# Patient Record
Sex: FEMALE | Race: WHITE | NOT HISPANIC OR LATINO | URBAN - METROPOLITAN AREA
[De-identification: names, ages, dates, MRNs, and addresses within clinical notes are randomized per-mention and may not be internally consistent; named-entity substitution may affect disease eponyms.]

---

## 2020-10-16 ENCOUNTER — EMERGENCY (EMERGENCY)
Facility: HOSPITAL | Age: 75
LOS: 1 days | Discharge: HOME | End: 2020-10-16
Attending: INTERNAL MEDICINE | Admitting: INTERNAL MEDICINE
Payer: MEDICARE

## 2020-10-16 VITALS
HEIGHT: 64 IN | TEMPERATURE: 98 F | SYSTOLIC BLOOD PRESSURE: 211 MMHG | WEIGHT: 162.92 LBS | RESPIRATION RATE: 16 BRPM | HEART RATE: 67 BPM | DIASTOLIC BLOOD PRESSURE: 76 MMHG | OXYGEN SATURATION: 97 %

## 2020-10-16 VITALS
DIASTOLIC BLOOD PRESSURE: 75 MMHG | RESPIRATION RATE: 17 BRPM | HEART RATE: 68 BPM | SYSTOLIC BLOOD PRESSURE: 182 MMHG | OXYGEN SATURATION: 96 % | TEMPERATURE: 98 F

## 2020-10-16 DIAGNOSIS — M25.569 PAIN IN UNSPECIFIED KNEE: ICD-10-CM

## 2020-10-16 LAB
ALBUMIN SERPL ELPH-MCNC: 3.7 G/DL — SIGNIFICANT CHANGE UP (ref 3.3–5)
ALP SERPL-CCNC: 75 U/L — SIGNIFICANT CHANGE UP (ref 40–120)
ALT FLD-CCNC: 11 U/L — SIGNIFICANT CHANGE UP (ref 10–45)
ANION GAP SERPL CALC-SCNC: 8 MMOL/L — SIGNIFICANT CHANGE UP (ref 5–17)
AST SERPL-CCNC: 22 U/L — SIGNIFICANT CHANGE UP (ref 10–40)
BASOPHILS # BLD AUTO: 0.04 K/UL — SIGNIFICANT CHANGE UP (ref 0–0.2)
BASOPHILS NFR BLD AUTO: 0.5 % — SIGNIFICANT CHANGE UP (ref 0–2)
BILIRUB SERPL-MCNC: 0.4 MG/DL — SIGNIFICANT CHANGE UP (ref 0.2–1.2)
BUN SERPL-MCNC: 14 MG/DL — SIGNIFICANT CHANGE UP (ref 7–23)
CALCIUM SERPL-MCNC: 9.7 MG/DL — SIGNIFICANT CHANGE UP (ref 8.4–10.5)
CHLORIDE SERPL-SCNC: 106 MMOL/L — SIGNIFICANT CHANGE UP (ref 96–108)
CO2 SERPL-SCNC: 29 MMOL/L — SIGNIFICANT CHANGE UP (ref 22–31)
CREAT SERPL-MCNC: 0.91 MG/DL — SIGNIFICANT CHANGE UP (ref 0.5–1.3)
EOSINOPHIL # BLD AUTO: 0.02 K/UL — SIGNIFICANT CHANGE UP (ref 0–0.5)
EOSINOPHIL NFR BLD AUTO: 0.3 % — SIGNIFICANT CHANGE UP (ref 0–6)
GLUCOSE SERPL-MCNC: 104 MG/DL — HIGH (ref 70–99)
HCT VFR BLD CALC: 41.6 % — SIGNIFICANT CHANGE UP (ref 34.5–45)
HGB BLD-MCNC: 14.1 G/DL — SIGNIFICANT CHANGE UP (ref 11.5–15.5)
IMM GRANULOCYTES NFR BLD AUTO: 0.4 % — SIGNIFICANT CHANGE UP (ref 0–1.5)
LYMPHOCYTES # BLD AUTO: 0.98 K/UL — LOW (ref 1–3.3)
LYMPHOCYTES # BLD AUTO: 13.1 % — SIGNIFICANT CHANGE UP (ref 13–44)
MCHC RBC-ENTMCNC: 31.2 PG — SIGNIFICANT CHANGE UP (ref 27–34)
MCHC RBC-ENTMCNC: 33.9 GM/DL — SIGNIFICANT CHANGE UP (ref 32–36)
MCV RBC AUTO: 92 FL — SIGNIFICANT CHANGE UP (ref 80–100)
MONOCYTES # BLD AUTO: 0.38 K/UL — SIGNIFICANT CHANGE UP (ref 0–0.9)
MONOCYTES NFR BLD AUTO: 5.1 % — SIGNIFICANT CHANGE UP (ref 2–14)
NEUTROPHILS # BLD AUTO: 6.05 K/UL — SIGNIFICANT CHANGE UP (ref 1.8–7.4)
NEUTROPHILS NFR BLD AUTO: 80.6 % — HIGH (ref 43–77)
NRBC # BLD: 0 /100 WBCS — SIGNIFICANT CHANGE UP (ref 0–0)
PLATELET # BLD AUTO: 245 K/UL — SIGNIFICANT CHANGE UP (ref 150–400)
POTASSIUM SERPL-MCNC: 3.4 MMOL/L — LOW (ref 3.5–5.3)
POTASSIUM SERPL-SCNC: 3.4 MMOL/L — LOW (ref 3.5–5.3)
PROT SERPL-MCNC: 7.2 G/DL — SIGNIFICANT CHANGE UP (ref 6–8.3)
RBC # BLD: 4.52 M/UL — SIGNIFICANT CHANGE UP (ref 3.8–5.2)
RBC # FLD: 12.3 % — SIGNIFICANT CHANGE UP (ref 10.3–14.5)
SODIUM SERPL-SCNC: 143 MMOL/L — SIGNIFICANT CHANGE UP (ref 135–145)
TROPONIN I SERPL-MCNC: <.017 NG/ML — LOW (ref 0.02–0.06)
WBC # BLD: 7.5 K/UL — SIGNIFICANT CHANGE UP (ref 3.8–10.5)
WBC # FLD AUTO: 7.5 K/UL — SIGNIFICANT CHANGE UP (ref 3.8–10.5)

## 2020-10-16 PROCEDURE — 74176 CT ABD & PELVIS W/O CONTRAST: CPT | Mod: 26

## 2020-10-16 PROCEDURE — 99284 EMERGENCY DEPT VISIT MOD MDM: CPT | Mod: 25

## 2020-10-16 PROCEDURE — 80053 COMPREHEN METABOLIC PANEL: CPT

## 2020-10-16 PROCEDURE — 93010 ELECTROCARDIOGRAM REPORT: CPT

## 2020-10-16 PROCEDURE — 96360 HYDRATION IV INFUSION INIT: CPT

## 2020-10-16 PROCEDURE — 93005 ELECTROCARDIOGRAM TRACING: CPT

## 2020-10-16 PROCEDURE — 85025 COMPLETE CBC W/AUTO DIFF WBC: CPT

## 2020-10-16 PROCEDURE — 74176 CT ABD & PELVIS W/O CONTRAST: CPT

## 2020-10-16 PROCEDURE — 36415 COLL VENOUS BLD VENIPUNCTURE: CPT

## 2020-10-16 PROCEDURE — 93971 EXTREMITY STUDY: CPT

## 2020-10-16 PROCEDURE — 73562 X-RAY EXAM OF KNEE 3: CPT | Mod: 26,RT

## 2020-10-16 PROCEDURE — 93971 EXTREMITY STUDY: CPT | Mod: 26,RT

## 2020-10-16 PROCEDURE — 84484 ASSAY OF TROPONIN QUANT: CPT

## 2020-10-16 PROCEDURE — 73562 X-RAY EXAM OF KNEE 3: CPT

## 2020-10-16 PROCEDURE — 99284 EMERGENCY DEPT VISIT MOD MDM: CPT

## 2020-10-16 RX ORDER — DICLOFENAC SODIUM 30 MG/G
1 GEL TOPICAL
Qty: 1 | Refills: 0
Start: 2020-10-16 | End: 2021-01-13

## 2020-10-16 RX ORDER — LIDOCAINE 4 G/100G
1 CREAM TOPICAL ONCE
Refills: 0 | Status: COMPLETED | OUTPATIENT
Start: 2020-10-16 | End: 2020-10-16

## 2020-10-16 RX ORDER — SODIUM CHLORIDE 9 MG/ML
1000 INJECTION INTRAMUSCULAR; INTRAVENOUS; SUBCUTANEOUS ONCE
Refills: 0 | Status: COMPLETED | OUTPATIENT
Start: 2020-10-16 | End: 2020-10-16

## 2020-10-16 RX ORDER — OXYCODONE AND ACETAMINOPHEN 5; 325 MG/1; MG/1
1 TABLET ORAL ONCE
Refills: 0 | Status: DISCONTINUED | OUTPATIENT
Start: 2020-10-16 | End: 2020-10-16

## 2020-10-16 RX ADMIN — OXYCODONE AND ACETAMINOPHEN 1 TABLET(S): 5; 325 TABLET ORAL at 11:51

## 2020-10-16 RX ADMIN — LIDOCAINE 1 PATCH: 4 CREAM TOPICAL at 14:32

## 2020-10-16 RX ADMIN — SODIUM CHLORIDE 1000 MILLILITER(S): 9 INJECTION INTRAMUSCULAR; INTRAVENOUS; SUBCUTANEOUS at 12:00

## 2020-10-16 RX ADMIN — OXYCODONE AND ACETAMINOPHEN 1 TABLET(S): 5; 325 TABLET ORAL at 12:21

## 2020-10-16 RX ADMIN — SODIUM CHLORIDE 1000 MILLILITER(S): 9 INJECTION INTRAMUSCULAR; INTRAVENOUS; SUBCUTANEOUS at 11:00

## 2020-10-16 NOTE — ED ADULT NURSE NOTE - CHPI ED NUR SYMPTOMS NEG
no deformity/no numbness/no tingling/no abrasion/no bruising/no stiffness/no fever/no back pain/no weakness

## 2020-10-16 NOTE — ED ADULT NURSE NOTE - NSIMPLEMENTINTERV_GEN_ALL_ED
Implemented All Fall Risk Interventions:  [place] to call system. Call bell, personal items and telephone within reach. Instruct patient to call for assistance. Room bathroom lighting operational. Non-slip footwear when patient is off stretcher. Physically safe environment: no spills, clutter or unnecessary equipment. Stretcher in lowest position, wheels locked, appropriate side rails in place. Provide visual cue, wrist band, yellow gown, etc. Monitor gait and stability. Monitor for mental status changes and reorient to person, place, and time. Review medications for side effects contributing to fall risk. Reinforce activity limits and safety measures with patient and family.

## 2020-10-16 NOTE — ED PROVIDER NOTE - CARE PROVIDER_API CALL
Easton Lim  ORTHOPAEDIC SPORTS MEDICINE  [address] [facility], [facility] [address]  [place] [address]  Phone: [phone]  Fax: [phone]  Follow Up Time:

## 2020-10-16 NOTE — ED ADULT NURSE NOTE - OBJECTIVE STATEMENT
Patient presents to ED alert and oriented x3 with c/o sudden onset right knee pain. Patient states last week she was doing housework and when she bent over she felt a sharp pain in her right knee that resolved immediately. Patient states she was unable to put weight on right knee today stating she was unable to walk. Patient has PMH of chronic back pain. Denies fall, injury to knee, chest pain, SOB, or other complaints at this time.

## 2020-10-16 NOTE — ED PROVIDER NOTE - CLINICAL SUMMARY MEDICAL DECISION MAKING FREE TEXT BOX
pain R knee pt had lower back pain diaphoretic family was concerned of intra abd pathology and clot in legs ct abd , cvt study neg R knee xr no fx

## 2020-10-16 NOTE — ED PROVIDER NOTE - PATIENT PORTAL LINK FT
You can access the FollowMyHealth Patient Portal offered by [facility] by registering at the following website: [email or web address]. By joining [employer]’s FollowMyHealth portal, you will also be able to view your health information using other applications (apps) compatible with our system.

## 2020-10-19 PROBLEM — I10 ESSENTIAL (PRIMARY) HYPERTENSION: Chronic | Status: ACTIVE | Noted: 2020-10-16

## 2020-10-22 ENCOUNTER — APPOINTMENT (OUTPATIENT)
Dept: ORTHOPEDIC SURGERY | Facility: CLINIC | Age: 75
End: 2020-10-22
Payer: MEDICARE

## 2020-10-22 VITALS — BODY MASS INDEX: 28 KG/M2 | HEIGHT: 64 IN | WEIGHT: 164 LBS

## 2020-10-22 DIAGNOSIS — M17.11 UNILATERAL PRIMARY OSTEOARTHRITIS, RIGHT KNEE: ICD-10-CM

## 2020-10-22 PROCEDURE — 99203 OFFICE O/P NEW LOW 30 MIN: CPT

## 2020-10-22 PROCEDURE — 99072 ADDL SUPL MATRL&STAF TM PHE: CPT

## 2020-10-23 NOTE — DISCUSSION/SUMMARY
[de-identified] : The underlying pathophysiology was reviewed in great detail with the patient as well as the various treatment options, including ice, analgesics, NSAIDs, Physical therapy, steroid injections, hyaluronic gel injections. \par \par A home exercise sheet was given and discussed with the patient to follow.\par \par Activity modifications and restrictions were discussed. I advised avoiding deep bending, squatting and high intensity activity.\par \par If pain persists may order MRI to rule out meniscus tear. \par \par FU 6 weeks. \par \par All questions were answered, all alternatives discussed and the patient is in complete agreement with that plan. Follow-up appointment as instructed. Any issues and the patient will call or come in sooner.

## 2020-10-23 NOTE — HISTORY OF PRESENT ILLNESS
[de-identified] : CELSO MURPHY is a 75 year female presenting to the office complaining of right knee pain. She  presents to the office ambulating with a walker. Patient reports pain since 10/16/2020. Denies injury or trauma to the area. She went to [facility] at this time where she had xrays of the right knee negative for acute fracture/dislocation but positive for osteoarthritis. The patient describes the pain as a dull aching, and occasionally sharp pain localized to the medial aspect of her right knee that is intermittent in nature. Her   symptoms are exacerbated with walking and standing.  Pain is alleviated with rest.  Patient denies instability, catching or locking of the knee. \par Patient is taking Tylenol for pain relief with moderate relief in symptoms.Patient denies any other complaints at this time.

## 2020-10-23 NOTE — PHYSICAL EXAM
[de-identified] : Right Lower Extremity\par o Knee :\par ¦ Inspection/Palpation : mild medial joint line tenderness to palpation, no swelling, no deformity\par ¦ Range of Motion : 0 - 120 degrees, no crepitus\par ¦ Stability : no valgus or varus instability present on provocative testing, Lachman’s Test (-)\par ¦ Strength : flexion and extension 3/5, adduction 3/5, glute med 3/5\par o Muscle Bulk : normal muscle bulk present\par o Skin : no erythema, no ecchymosis\par o Sensation : sensation to pin intact\par o Vascular Exam : no edema, no cyanosis, dorsalis pedis artery pulse 2+, posterior tibial artery pulse 2+\par \par Left Lower Extremity\par o Knee :\par ¦ Inspection/Palpation : no tenderness to palpation, no swelling, no deformity\par ¦ Range of Motion : 0 -120 degrees, no crepitus\par ¦ Stability : no valgus or varus instability present on provocative testing, Lachman’s Test (-)\par ¦ Strength : flexion and extension 3/5, adduction 3/5, glute med 3/5\par o Muscle Bulk : normal muscle bulk present\par o Skin : no erythema, no ecchymosis\par o Sensation : sensation to pin intact\par o Vascular Exam : no edema, no cyanosis, dorsalis pedis artery pulse 2+, posterior tibial artery pulse 2+ [de-identified] : o Xray of the right knee performed on 10/16/2020 at [facility]: Impression: \par  ¦ No acute fracture or dislocation. \par \par

## 2021-09-17 ENCOUNTER — APPOINTMENT (OUTPATIENT)
Dept: RADIOLOGY | Facility: HOSPITAL | Age: 76
End: 2021-09-17
Payer: MEDICARE

## 2021-09-17 ENCOUNTER — OUTPATIENT (OUTPATIENT)
Dept: OUTPATIENT SERVICES | Facility: HOSPITAL | Age: 76
LOS: 1 days | End: 2021-09-17
Payer: MEDICARE

## 2021-09-17 DIAGNOSIS — Z00.8 ENCOUNTER FOR OTHER GENERAL EXAMINATION: ICD-10-CM

## 2021-09-17 PROCEDURE — 74021 RADEX ABDOMEN 3+ VIEWS: CPT

## 2021-09-17 PROCEDURE — 74021 RADEX ABDOMEN 3+ VIEWS: CPT | Mod: 26

## 2021-10-05 ENCOUNTER — APPOINTMENT (OUTPATIENT)
Dept: CT IMAGING | Facility: HOSPITAL | Age: 76
End: 2021-10-05
Payer: MEDICARE

## 2021-10-05 ENCOUNTER — OUTPATIENT (OUTPATIENT)
Dept: OUTPATIENT SERVICES | Facility: HOSPITAL | Age: 76
LOS: 1 days | End: 2021-10-05
Payer: MEDICARE

## 2021-10-05 DIAGNOSIS — Z00.8 ENCOUNTER FOR OTHER GENERAL EXAMINATION: ICD-10-CM

## 2021-10-05 PROCEDURE — 74177 CT ABD & PELVIS W/CONTRAST: CPT | Mod: MH

## 2021-10-05 PROCEDURE — 71250 CT THORAX DX C-: CPT | Mod: 26,MH

## 2021-10-05 PROCEDURE — 74177 CT ABD & PELVIS W/CONTRAST: CPT | Mod: 26,MH

## 2021-10-05 PROCEDURE — 71250 CT THORAX DX C-: CPT | Mod: MH

## 2021-10-05 PROCEDURE — 82565 ASSAY OF CREATININE: CPT

## 2021-10-25 ENCOUNTER — APPOINTMENT (OUTPATIENT)
Dept: SURGERY | Facility: CLINIC | Age: 76
End: 2021-10-25
Payer: MEDICARE

## 2021-10-25 VITALS
TEMPERATURE: 97.2 F | OXYGEN SATURATION: 97 % | HEART RATE: 74 BPM | HEIGHT: 63 IN | SYSTOLIC BLOOD PRESSURE: 168 MMHG | DIASTOLIC BLOOD PRESSURE: 91 MMHG | WEIGHT: 148 LBS | RESPIRATION RATE: 16 BRPM | BODY MASS INDEX: 26.22 KG/M2

## 2021-10-25 DIAGNOSIS — Z63.4 DISAPPEARANCE AND DEATH OF FAMILY MEMBER: ICD-10-CM

## 2021-10-25 DIAGNOSIS — Z78.9 OTHER SPECIFIED HEALTH STATUS: ICD-10-CM

## 2021-10-25 PROCEDURE — 99205 OFFICE O/P NEW HI 60 MIN: CPT

## 2021-10-25 RX ORDER — CANDESARTAN CILEXETIL 32 MG/1
32 TABLET ORAL
Refills: 0 | Status: ACTIVE | COMMUNITY

## 2021-10-25 RX ORDER — HYDROCHLOROTHIAZIDE 12.5 MG/1
12.5 CAPSULE ORAL
Refills: 0 | Status: ACTIVE | COMMUNITY

## 2021-10-25 RX ORDER — OMEPRAZOLE 20 MG/1
20 CAPSULE, DELAYED RELEASE ORAL
Refills: 0 | Status: ACTIVE | COMMUNITY

## 2021-10-25 SDOH — SOCIAL STABILITY - SOCIAL INSECURITY: DISSAPEARANCE AND DEATH OF FAMILY MEMBER: Z63.4

## 2021-10-25 NOTE — PHYSICAL EXAM
[Normal Thyroid] : the thyroid was normal [Normal Breath Sounds] : Normal breath sounds [Normal Rate and Rhythm] : normal rate and rhythm [No HSM] : no hepatosplenomegaly [No Rash or Lesion] : No rash or lesion [Alert] : alert [Oriented to Person] : oriented to person [Oriented to Place] : oriented to place [Oriented to Time] : oriented to time [Calm] : calm [JVD] : no jugular venous distention  [Abdominal Masses] : No abdominal masses [Abdomen Tenderness] : ~T ~M No abdominal tenderness [de-identified] : WELL NOURISHED NOT IN ANY DISTRESS  [de-identified] : ANICTERIC [de-identified] : NO LN  [de-identified] : mID LINE SCAR, NO INSC HERNIAE.\par Left Lower quadrant reducible AAW hernia  [de-identified] : deferred

## 2021-10-25 NOTE — REVIEW OF SYSTEMS
[Vomiting] : vomiting [Constipation] : constipation [Heartburn] : heartburn [Negative] : Heme/Lymph [Abdominal Pain] : no abdominal pain [Diarrhea] : no diarrhea [Melena] : no melena [FreeTextEntry7] : very rare and occasional vomiting

## 2021-10-25 NOTE — ASSESSMENT
[FreeTextEntry1] : 76yF with Chronic Dyspepsia and recently symptomatic dysphagia/ heartburn.\par ALso, has a ventral hernia\par EGD and CT reviewed\par Discussed at length with the patient and her daughter \par All questions answered\par risks and benefits of operative and non operative options explained\par SHe expressed understanding,fully\par

## 2021-10-25 NOTE — HISTORY OF PRESENT ILLNESS
[de-identified] : Melina is a 77 y/o female here a consultation.\par  Endoscopy from 10/13/21 demonstrates a j-shaped stomach and pylorus at 40 cm from the incisors, consistent with an intra-thoracic stomach.  [de-identified] : 25 years' h/o dyspepsia and retro sternal discomfort\par 1 year h/o exacerbation of the same symptoms\par Ct done last year as a part of trauma work-up and another CT last week.\par She has predominantly 2 symptoms:\par 1. Excessive burping/stasis ? of food behind the chest\par 2. retrosternal fullness after meals\par \par No h/o choking \par No h/o nocturnal regurgitation\par She has adapted her PO intake to frequent and spaced apart smaller meals \par \par PSH= ROBIN for endometriosis and \par An incidental asymptomatic LLQ Interstitial hernia of the AAW\par

## 2021-10-25 NOTE — PLAN
[FreeTextEntry1] : PCP Pre-Op clearances\par Call ofice to schedule for Lap Hiatal herniplasty\par The AAW Ventral hernia will either be addressed at the same time or at a later date depending upon the intra-op findings and events.\par The risk and benefits of the procedure were discussed with patient. The risk including, but not limited to, risk of bleeding, infection, recurrence, injury to other organs (nerves, esophagus, stomach, liver, etc), bloating, wrap being too tight and requiring reoperation or other interventions, delayed gastric emptying and persistent reflux. Several general anesthesia risks like MI, Stroke, respiratory complications and death were discussed. Need for modified diet postoperatively was discussed. All questions were answered and education materials were provided.\par \par \par Jose Donahue MD, FACS, FASMBS\par Chief Division of Minimally Invasive Surgery\par Co-Director of Bariatric Surgery \par [facility]\[place] [address]

## 2021-11-05 ENCOUNTER — EMERGENCY (EMERGENCY)
Facility: HOSPITAL | Age: 76
LOS: 1 days | Discharge: HOME | End: 2021-11-05
Attending: INTERNAL MEDICINE | Admitting: INTERNAL MEDICINE
Payer: MEDICARE

## 2021-11-05 VITALS
OXYGEN SATURATION: 97 % | RESPIRATION RATE: 17 BRPM | HEIGHT: 64 IN | TEMPERATURE: 98 F | SYSTOLIC BLOOD PRESSURE: 132 MMHG | WEIGHT: 149.91 LBS | DIASTOLIC BLOOD PRESSURE: 76 MMHG | HEART RATE: 64 BPM

## 2021-11-05 VITALS
OXYGEN SATURATION: 98 % | RESPIRATION RATE: 16 BRPM | DIASTOLIC BLOOD PRESSURE: 65 MMHG | SYSTOLIC BLOOD PRESSURE: 145 MMHG | HEART RATE: 80 BPM

## 2021-11-05 LAB
ALBUMIN SERPL ELPH-MCNC: 3.6 G/DL — SIGNIFICANT CHANGE UP (ref 3.3–5)
ALP SERPL-CCNC: 68 U/L — SIGNIFICANT CHANGE UP (ref 40–120)
ALT FLD-CCNC: 9 U/L — LOW (ref 10–45)
ANION GAP SERPL CALC-SCNC: 10 MMOL/L — SIGNIFICANT CHANGE UP (ref 5–17)
AST SERPL-CCNC: 26 U/L — SIGNIFICANT CHANGE UP (ref 10–40)
BASOPHILS # BLD AUTO: 0.07 K/UL — SIGNIFICANT CHANGE UP (ref 0–0.2)
BASOPHILS NFR BLD AUTO: 0.5 % — SIGNIFICANT CHANGE UP (ref 0–2)
BILIRUB SERPL-MCNC: 0.4 MG/DL — SIGNIFICANT CHANGE UP (ref 0.2–1.2)
BUN SERPL-MCNC: 18 MG/DL — SIGNIFICANT CHANGE UP (ref 7–23)
CALCIUM SERPL-MCNC: 9.2 MG/DL — SIGNIFICANT CHANGE UP (ref 8.4–10.5)
CHLORIDE SERPL-SCNC: 104 MMOL/L — SIGNIFICANT CHANGE UP (ref 96–108)
CO2 SERPL-SCNC: 26 MMOL/L — SIGNIFICANT CHANGE UP (ref 22–31)
CREAT SERPL-MCNC: 0.93 MG/DL — SIGNIFICANT CHANGE UP (ref 0.5–1.3)
EOSINOPHIL # BLD AUTO: 0.03 K/UL — SIGNIFICANT CHANGE UP (ref 0–0.5)
EOSINOPHIL NFR BLD AUTO: 0.2 % — SIGNIFICANT CHANGE UP (ref 0–6)
GLUCOSE SERPL-MCNC: 112 MG/DL — HIGH (ref 70–99)
HCT VFR BLD CALC: 40.7 % — SIGNIFICANT CHANGE UP (ref 34.5–45)
HGB BLD-MCNC: 13.8 G/DL — SIGNIFICANT CHANGE UP (ref 11.5–15.5)
IMM GRANULOCYTES NFR BLD AUTO: 0.6 % — SIGNIFICANT CHANGE UP (ref 0–1.5)
LYMPHOCYTES # BLD AUTO: 1.21 K/UL — SIGNIFICANT CHANGE UP (ref 1–3.3)
LYMPHOCYTES # BLD AUTO: 8.4 % — LOW (ref 13–44)
MCHC RBC-ENTMCNC: 31.6 PG — SIGNIFICANT CHANGE UP (ref 27–34)
MCHC RBC-ENTMCNC: 33.9 GM/DL — SIGNIFICANT CHANGE UP (ref 32–36)
MCV RBC AUTO: 93.1 FL — SIGNIFICANT CHANGE UP (ref 80–100)
MONOCYTES # BLD AUTO: 0.84 K/UL — SIGNIFICANT CHANGE UP (ref 0–0.9)
MONOCYTES NFR BLD AUTO: 5.8 % — SIGNIFICANT CHANGE UP (ref 2–14)
NEUTROPHILS # BLD AUTO: 12.19 K/UL — HIGH (ref 1.8–7.4)
NEUTROPHILS NFR BLD AUTO: 84.5 % — HIGH (ref 43–77)
NRBC # BLD: 0 /100 WBCS — SIGNIFICANT CHANGE UP (ref 0–0)
PLATELET # BLD AUTO: 276 K/UL — SIGNIFICANT CHANGE UP (ref 150–400)
POTASSIUM SERPL-MCNC: 3.9 MMOL/L — SIGNIFICANT CHANGE UP (ref 3.5–5.3)
POTASSIUM SERPL-SCNC: 3.9 MMOL/L — SIGNIFICANT CHANGE UP (ref 3.5–5.3)
PROT SERPL-MCNC: 6.7 G/DL — SIGNIFICANT CHANGE UP (ref 6–8.3)
RBC # BLD: 4.37 M/UL — SIGNIFICANT CHANGE UP (ref 3.8–5.2)
RBC # FLD: 13.1 % — SIGNIFICANT CHANGE UP (ref 10.3–14.5)
SARS-COV-2 RNA SPEC QL NAA+PROBE: SIGNIFICANT CHANGE UP
SODIUM SERPL-SCNC: 140 MMOL/L — SIGNIFICANT CHANGE UP (ref 135–145)
WBC # BLD: 14.43 K/UL — HIGH (ref 3.8–10.5)
WBC # FLD AUTO: 14.43 K/UL — HIGH (ref 3.8–10.5)

## 2021-11-05 PROCEDURE — 96374 THER/PROPH/DIAG INJ IV PUSH: CPT

## 2021-11-05 PROCEDURE — 70450 CT HEAD/BRAIN W/O DYE: CPT | Mod: MA

## 2021-11-05 PROCEDURE — 36415 COLL VENOUS BLD VENIPUNCTURE: CPT

## 2021-11-05 PROCEDURE — 96361 HYDRATE IV INFUSION ADD-ON: CPT

## 2021-11-05 PROCEDURE — 80053 COMPREHEN METABOLIC PANEL: CPT

## 2021-11-05 PROCEDURE — 73030 X-RAY EXAM OF SHOULDER: CPT

## 2021-11-05 PROCEDURE — 73100 X-RAY EXAM OF WRIST: CPT

## 2021-11-05 PROCEDURE — 73030 X-RAY EXAM OF SHOULDER: CPT | Mod: 26,LT

## 2021-11-05 PROCEDURE — 85025 COMPLETE CBC W/AUTO DIFF WBC: CPT

## 2021-11-05 PROCEDURE — 87635 SARS-COV-2 COVID-19 AMP PRB: CPT

## 2021-11-05 PROCEDURE — 25605 CLTX DST RDL FX/EPHYS SEP W/: CPT | Mod: RT

## 2021-11-05 PROCEDURE — 73110 X-RAY EXAM OF WRIST: CPT | Mod: 26,LT,77

## 2021-11-05 PROCEDURE — 99283 EMERGENCY DEPT VISIT LOW MDM: CPT | Mod: 57

## 2021-11-05 PROCEDURE — 99284 EMERGENCY DEPT VISIT MOD MDM: CPT

## 2021-11-05 PROCEDURE — 73060 X-RAY EXAM OF HUMERUS: CPT | Mod: 26,LT

## 2021-11-05 PROCEDURE — 70450 CT HEAD/BRAIN W/O DYE: CPT | Mod: 26,MA

## 2021-11-05 PROCEDURE — 73060 X-RAY EXAM OF HUMERUS: CPT

## 2021-11-05 PROCEDURE — 25605 CLTX DST RDL FX/EPHYS SEP W/: CPT | Mod: LT

## 2021-11-05 PROCEDURE — 99285 EMERGENCY DEPT VISIT HI MDM: CPT | Mod: 25

## 2021-11-05 PROCEDURE — 73110 X-RAY EXAM OF WRIST: CPT | Mod: 26,LT

## 2021-11-05 PROCEDURE — 73110 X-RAY EXAM OF WRIST: CPT

## 2021-11-05 PROCEDURE — 96375 TX/PRO/DX INJ NEW DRUG ADDON: CPT

## 2021-11-05 RX ORDER — HYDROMORPHONE HYDROCHLORIDE 2 MG/ML
1 INJECTION INTRAMUSCULAR; INTRAVENOUS; SUBCUTANEOUS ONCE
Refills: 0 | Status: DISCONTINUED | OUTPATIENT
Start: 2021-11-05 | End: 2021-11-05

## 2021-11-05 RX ORDER — ONDANSETRON 8 MG/1
1 TABLET, FILM COATED ORAL
Qty: 16 | Refills: 0
Start: 2021-11-05 | End: 2021-11-08

## 2021-11-05 RX ORDER — MORPHINE SULFATE 50 MG/1
4 CAPSULE, EXTENDED RELEASE ORAL ONCE
Refills: 0 | Status: DISCONTINUED | OUTPATIENT
Start: 2021-11-05 | End: 2021-11-05

## 2021-11-05 RX ORDER — OXYCODONE HYDROCHLORIDE 5 MG/1
1 TABLET ORAL
Qty: 16 | Refills: 0
Start: 2021-11-05 | End: 2021-11-08

## 2021-11-05 RX ORDER — ONDANSETRON 8 MG/1
4 TABLET, FILM COATED ORAL ONCE
Refills: 0 | Status: COMPLETED | OUTPATIENT
Start: 2021-11-05 | End: 2021-11-05

## 2021-11-05 RX ORDER — SODIUM CHLORIDE 9 MG/ML
1000 INJECTION INTRAMUSCULAR; INTRAVENOUS; SUBCUTANEOUS ONCE
Refills: 0 | Status: COMPLETED | OUTPATIENT
Start: 2021-11-05 | End: 2021-11-05

## 2021-11-05 RX ADMIN — MORPHINE SULFATE 4 MILLIGRAM(S): 50 CAPSULE, EXTENDED RELEASE ORAL at 14:15

## 2021-11-05 RX ADMIN — MORPHINE SULFATE 4 MILLIGRAM(S): 50 CAPSULE, EXTENDED RELEASE ORAL at 13:44

## 2021-11-05 RX ADMIN — ONDANSETRON 4 MILLIGRAM(S): 8 TABLET, FILM COATED ORAL at 13:44

## 2021-11-05 RX ADMIN — SODIUM CHLORIDE 1000 MILLILITER(S): 9 INJECTION INTRAMUSCULAR; INTRAVENOUS; SUBCUTANEOUS at 14:25

## 2021-11-05 RX ADMIN — SODIUM CHLORIDE 1000 MILLILITER(S): 9 INJECTION INTRAMUSCULAR; INTRAVENOUS; SUBCUTANEOUS at 15:47

## 2021-11-05 RX ADMIN — HYDROMORPHONE HYDROCHLORIDE 1 MILLIGRAM(S): 2 INJECTION INTRAMUSCULAR; INTRAVENOUS; SUBCUTANEOUS at 17:14

## 2021-11-05 RX ADMIN — HYDROMORPHONE HYDROCHLORIDE 1 MILLIGRAM(S): 2 INJECTION INTRAMUSCULAR; INTRAVENOUS; SUBCUTANEOUS at 16:57

## 2021-11-05 NOTE — ED PROVIDER NOTE - OBJECTIVE STATEMENT
77 y/o F with h/o HTN, Hiatal hernia (scheduled for repair with Dr. Junior 11/17) pw LUE arm and wrist pain s/p trip and fall walking up steps landing on an outstretched arm. (+) frontal head trauma, no LOC. Denies neck/back pain, dizziness, headache, nausea, vomiting, numbness, tingling. Given Fentanyl 50mg IM via EMS that she states did not help. 77 y/o F with h/o HTN, Hiatal hernia (scheduled for repair with Dr. Junior 11/17) pw LUE arm and wrist pain s/p trip and fall walking up steps landing on an outstretched arm. (+) frontal head trauma, no LOC. Denies neck/back pain, dizziness, headache, nausea, vomiting, numbness, tingling. Given Fentanyl 50mg IM via EMS that she states did not help.  PMD- Alejandro

## 2021-11-05 NOTE — ED PROVIDER NOTE - PATIENT PORTAL LINK FT
You can access the FollowMyHealth Patient Portal offered by [facility] by registering at the following website: [email or web address]. By joining [employer]’s FollowMyHealth portal, you will also be able to view your health information using other applications (apps) compatible with our system.

## 2021-11-05 NOTE — ED PROVIDER NOTE - ATTENDING CONTRIBUTION TO CARE
75 y/o F with h/o HTN, Hiatal hernia (scheduled for repair with Dr. Junior 11/17) pw LUE arm and wrist pain s/p trip and fall walking up steps landing on an outstretched arm. (+) frontal head trauma, no LOC. Denies neck/back pain, dizziness, headache, nausea, vomiting, numbness, tingling. Given Fentanyl 50mg IM via EMS that she states did not help.   Plan: Will draw surgical labs, check xray LUE shoulder, arm and wrist, give pain medication, splint and reassess  Dr. Coronado:  I have reviewed and discussed with the PA/ resident the case specifics, including the history, physical assessment, evaluation, conclusion, laboratory results, and medical plan. I agree with the contents, and conclusions. I have personally examined, and interviewed the patient.

## 2021-11-05 NOTE — ED PROVIDER NOTE - PROGRESS NOTE DETAILS
PA Tiberio-  Acute comminuted, impacted and intra-articular fracture deformity of the distal left radius identified. Acute fracture of the ulnar styloid process identified. Acute impacted fracture deformity of the proximal left humerus identified. Seen by Ortho Dr. Lim reduced, and splinted. Requesting patient see him next week Thursday

## 2021-11-05 NOTE — ED PROVIDER NOTE - CLINICAL SUMMARY MEDICAL DECISION MAKING FREE TEXT BOX
77 y/o F with h/o HTN, Hiatal hernia (scheduled for repair with Dr. Junior 11/17) pw LUE arm and wrist pain s/p trip and fall walking up steps landing on an outstretched arm. (+) frontal head trauma, no LOC. Denies neck/back pain, dizziness, headache, nausea, vomiting, numbness, tingling. Given Fentanyl 50mg IM via EMS that she states did not help.   Plan: Will draw surgical labs, check xray LUE shoulder, arm and wrist, give pain medication, splint and reassess

## 2021-11-05 NOTE — CONSULT NOTE ADULT - ASSESSMENT
Left impacted humeral neck fracture.  Advise conservative management with sling immobilization and f/u with repeat x-rays in office next week.    Left displaced distal radius fracture  Under sterile precautions 10cc of 1% lidocaine was injected into the left distal radial fracture site.  A closed reduction of the left wrist was performed with traction and manual manipulation.  A sugar tong splint was applied.  Post reduction x-rays show improved reduction of fracture now in acceptable alignment with mild radial shortening.    Ice, elevation, NSAIDs  F/u in office 1 week.

## 2021-11-05 NOTE — ED PROVIDER NOTE - CARE PROVIDER_API CALL
Easton Lim)  Orthopaedic Sports Medicine; Orthopaedic Surgery  [address] [address]  Phone: [phone]  Fax: [phone]  Follow Up Time:

## 2021-11-05 NOTE — CONSULT NOTE ADULT - SUBJECTIVE AND OBJECTIVE BOX
CELSO MURPHY      76y Female with h/o HTN, Hiatal hernia (scheduled for repair with Dr. Junior 11/17) pw LUE arm and wrist pain s/p trip and fall walking up steps landing on an outstretched arm. Reports pain in left shoulder and left wrist. (+) frontal head trauma, no LOC. Denies neck/back pain, dizziness, headache, nausea, vomiting, numbness, tingling.                                                                                                                                  PAST MEDICAL HISTORY:  Back pain     HTN (hypertension).    Hx of prior left wrist fracture 13 years ago.      T(F): 97.6  HR: 80  BP: 145/65  RR: 16  SpO2: 98%                        13.8   14.43 )-----------( 276      ( 05 Nov 2021 13:36 )             40.7                     11-05    140  |  104  |  18  ----------------------------<  112<H>  3.9   |  26  |  0.93    Ca    9.2      05 Nov 2021 13:36    TPro  6.7  /  Alb  3.6  /  TBili  0.4  /  DBili  x   /  AST  26  /  ALT  9<L>  /  AlkPhos  68  11-05      Physical Exam :    -   Left shoulder with skin C/D/I, diffuse tenderness to palpation, pain with motion  -   Left wrist with skin C/D/I, diffuse tenderness to palpation, + swelling and radial deviation deformity, pain with motion  -   Distal Neurvascular status intact grossly.   -   Warm well perfused; capillary refill <3 seconds   -   (+)finger flexion/extension 5/5  -   (+) Sensation to light touch        EXAM:  XR SHOULDER COMP MIN 2V LT      PROCEDURE DATE:  11/05/2021        INTERPRETATION:  INDICATION: distal shoulder pain s/p fall outstretched arm    PRIORS: None    VIEWS: 2 views of the left shoulder region were performed.    FINDINGS: The left glenohumeral articulation appears grossly intact. Acute fracture deformity of the proximal left humerus identified, which appears impacted. No widening of the acromioclavicular joint space. The left thoracic ribs appear unremarkable.    IMPRESSION: Acute impacted fracture deformity proximal left humerus.    --- End of Report ---              YURI OSEGUERA MD; Attending Radiologist  This document has been electronically signed. Nov 5 2021  2:18PM        EXAM:  XR WRIST COMP MIN 3 VIEWS LT      PROCEDURE DATE:  11/05/2021        INTERPRETATION:  CLINICAL INDICATION:  Wrist pain following fall    COMPARISON:  1/31/2008    TECHNIQUE:  Multiple views of the left wrist region performed.    FINDINGS:  Acute comminuted, impacted and intra-articular fracture deformity of the distal left radius identified. Acute fracture of the ulnar styloid process identified. Marked soft tissue swelling is evident. The radiocarpal joint space appears intact. Proximal carpal row articulation appears unremarkable. Degenerative osteoarthritis of the first carpal/metacarpal joint space noted.    IMPRESSION:  As above.    --- End of Report ---              YURI OSEGUERA MD; Attending Radiologist  This document has been electronically signed. Nov 5 2021  2:20PM

## 2021-11-05 NOTE — ED PROVIDER NOTE - NSFOLLOWUPINSTRUCTIONS_ED_ALL_ED_FT
Follow up with Dr. Lmi Thursday 11/11/21  Take Tylenol 650mg every 4-6 hours as needed for pain   Percocet 1 tablet every 6 hrs as needed for breakthrough discomfort- caution drowsiness while taking this medication- do not drive or operate heavy machinery.   Keep the splint on and dry  Worsening, continued or ANY new concerning symptoms return to the emergency department.      Arm Fracture in Adults    WHAT YOU NEED TO KNOW:    An arm fracture is a crack or break in one or more of the bones in your arm.     Adult Arm Bones         DISCHARGE INSTRUCTIONS:    Return to the emergency department if:   •The pain in your injured arm does not get better or gets worse, even after you rest and take medicine.      •Your injured arm, hand, or fingers feel numb.      •Your arm is swollen, red, and feels warm.      •Your skin over the fracture is swollen, cold, or pale.      •You cannot move your arm, hand, or fingers.       Call your doctor if:   •You have a fever.      •Your brace or splint becomes wet, damaged, or comes off.      •You have questions or concerns about your injury, treatment, or care.      Medicines: You may need any of the following:   •NSAIDs, such as ibuprofen, help decrease swelling, pain, and fever. This medicine is available with or without a doctor's order. NSAIDs can cause stomach bleeding or kidney problems in certain people. If you take blood thinner medicine, always ask your healthcare provider if NSAIDs are safe for you. Always read the medicine label and follow directions.      •Acetaminophen decreases pain and fever. It is available without a doctor's order. Ask how much to take and how often to take it. Follow directions. Read the labels of all other medicines you are using to see if they also contain acetaminophen, or ask your doctor or pharmacist. Acetaminophen can cause liver damage if not taken correctly. Do not use more than 4 grams (4,000 milligrams) total of acetaminophen in one day.       •Prescription pain medicine may be given. Ask your healthcare provider how to take this medicine safely. Some prescription pain medicines contain acetaminophen. Do not take other medicines that contain acetaminophen without talking to your healthcare provider. Too much acetaminophen may cause liver damage. Prescription pain medicine may cause constipation. Ask your healthcare provider how to prevent or treat constipation.       •Take your medicine as directed. Contact your healthcare provider if you think your medicine is not helping or if you have side effects. Tell him or her if you are allergic to any medicine. Keep a list of the medicines, vitamins, and herbs you take. Include the amounts, and when and why you take them. Bring the list or the pill bottles to follow-up visits. Carry your medicine list with you in case of an emergency.      Self-care:   •Elevate your arm above the level of your heart as often as you can. This will help decrease swelling and pain. Prop your arm on pillows or blankets to keep it elevated comfortably.             •Apply ice on your arm for 15 to 20 minutes every hour or as directed. Use an ice pack, or put crushed ice in a plastic bag. Cover it with a towel. Ice helps prevent tissue damage and decreases swelling and pain.      •Rest your arm as much as possible. Ask your healthcare provider when you can put pressure or weight on your arm. Also ask when you can return to sports or exercise.       Care for your cast or splint: Ask your healthcare provider when it is okay to bathe. Do not get your cast or splint wet. Before you take a bath or shower, cover your cast or splint with a plastic bag. Tape the bag to your skin to help keep water out. Hold your arm away from the water in case the bag has a hole or tear.  •Check the skin around your cast or splint each day for any redness or open skin.      •Do not use a sharp or pointed object to scratch your skin under the cast or splint.      Physical therapy: A physical therapist teaches you exercises to help improve movement and strength, and to decrease pain.     Follow up with your doctor within 1 week: You may need to see a bone specialist within 3 to 4 days if you need surgery or more treatment. Write down your questions so you remember to ask them during your visits.

## 2021-11-05 NOTE — ED ADULT NURSE NOTE - NSIMPLEMENTINTERV_GEN_ALL_ED
Implemented All Fall Risk Interventions:  [place] to call system. Call bell, personal items and telephone within reach. Instruct patient to call for assistance. Room bathroom lighting operational. Non-slip footwear when patient is off stretcher. Physically safe environment: no spills, clutter or unnecessary equipment. Stretcher in lowest position, wheels locked, appropriate side rails in place. Provide visual cue, wrist band, yellow gown, etc. Monitor gait and stability. Monitor for mental status changes and reorient to person, place, and time. Review medications for side effects contributing to fall risk. Reinforce activity limits and safety measures with patient and family.

## 2021-11-05 NOTE — ED PROVIDER NOTE - CARE PLAN
1 Principal Discharge DX:	Displaced fracture of proximal end of left humerus  Secondary Diagnosis:	Fracture of left distal radius

## 2021-11-11 ENCOUNTER — APPOINTMENT (OUTPATIENT)
Dept: ORTHOPEDIC SURGERY | Facility: CLINIC | Age: 76
End: 2021-11-11
Payer: MEDICARE

## 2021-11-11 VITALS — HEIGHT: 63 IN | WEIGHT: 148 LBS | BODY MASS INDEX: 26.22 KG/M2

## 2021-11-11 PROCEDURE — 99024 POSTOP FOLLOW-UP VISIT: CPT

## 2021-11-11 PROCEDURE — 73110 X-RAY EXAM OF WRIST: CPT | Mod: LT

## 2021-11-11 PROCEDURE — 29075 APPL CST ELBW FNGR SHORT ARM: CPT | Mod: 58,LT

## 2021-11-11 NOTE — DISCUSSION/SUMMARY
[de-identified] : The underlying pathophysiology was reviewed in great detail with the patient as well as the various treatment options, including ice, analgesics, NSAIDs, Physical therapy, steroid injections, immobilization, surgical fixation \par \par A short arm cast was applied in clinic today. \par \par Remain in sling for immobilization of shoulder.\par \par Activity modifications and restrictions were discussed. \par \par FU 3 weeks for repeat xrays wrist and shoulder. \par \par All questions were answered, all alternatives discussed and the patient is in complete agreement with that plan. Follow-up appointment as instructed. Any issues and the patient will call or come in sooner.

## 2021-11-11 NOTE — CHART NOTE - NSCHARTNOTEFT_GEN_A_CORE
TASHA called patient to follow up.  No answer.  As per HIE, patient is currently inpatient and followed up with ortho 11/11/21

## 2021-11-11 NOTE — HISTORY OF PRESENT ILLNESS
[de-identified] : Ms. CELSO MURPHY  is a 76 year  presenting to the office complaining of left wrist and shoulder pain She   presents to the office immobilized in a wrist splint and a sling. Patient reports pain began on 11/5/2021 after tripping and falling bracing her fall with her left arm. She went to [facility] ED after the fall where he had xrays revealing a distal radius fracture and a proximal humerus fracture.  The patient describes the pain as a dull aching, and occasionally sharp pain localized to the radial  aspect of her   left wrist that is intermittent in nature.  Her symptoms are exacerbated  with any movement of the wrist, hand, and gripping of the hand. Patient reports the pain is waking her   up at night.  Patient reports associated weakness. Denies numbness and tingling in the upper extremity. She is also complaining of left shoulder pain. \par

## 2021-11-11 NOTE — PROCEDURE
[de-identified] : A short arm cast was applied in clinic today. Neurovascular status was assessed pre and post placement. Patient tolerated the cast placement well, with no complaints.

## 2021-11-11 NOTE — PHYSICAL EXAM
[de-identified] : Left Upper Extremity\par o Shoulder :\par ¦ Inspection/Palpation : marked diffuse proximal humerus tenderness, with localized swelling and ecchymosis, no deformities\par ¦ Range of Motion : not assessed today due to fracture \par ¦ Strength : not assessed today due to fracture \par ¦ Stability : no joint instability on provocative testing\par ¦ Tests/Signs : not assessed today due to fracture\par \par o Wrist:\par ¦ Inspection/Palpation : marked distal radius tenderness to palpation with localized swelling or deformities\par ¦ Range of Motion : not assessed today due to fracture  no crepitus\par ¦ Strength : extension, flexion, ulnar deviation and radial deviation: not assessed today due to fracture \par ¦ Stability : no joint instability on provocative testing\par ¦ Tests/Signs : Tinel's sign (-) over carpal tunnel\par o Muscle Bulk : no atrophy\par o Sensation : sensation intact to light touch\par o Skin : no skin lesions or discoloration\par o Vascular Exam : no edema or cyanosis, radial and ulnar pulses normal\par \par \par   [de-identified] : o Left Wrist: AP, lateral and oblique views of the wrist were obtained, there are no soft tissue abnormalities, alignment is normal, normal appearing joint spaces, normal bone density, no bony lesions  Acute comminuted, impacted and intra-articular fracture deformity of the distal left radius identified. Acute fracture of the ulnar styloid process identified, unchanged compared to previous films, \par \par  \par \par Patient comes to today's visit with outside imaging already performed. I reviewed the images in detail with the patient and discussed the findings as highlighted below. \par \par \par EXAM: XR WRIST POST RED AP LAT 2V LT\par \par PROCEDURE DATE: 11/05/2021\par \par \par INTERPRETATION: Radiographs of the LEFT wrist\par \par CLINICAL INFORMATION: Status post reduction Pain.\par \par TECHNIQUE: Frontal, oblique and lateral views of the wrist were obtained.\par \par FINDINGS: No prior examinations are available for review.\par \par The osseous structures of the wrist show adequate alignment of the comminuted distal radial fracture. Overlying cast material obscures bony detail.. Intercarpal spacing and alignment are preserved. The common carpal metacarpal and first carpal metacarpal joints appear intact.\par \par Mild soft tissue swelling is seen. No radiopaque foreign body is seen.\par \par IMPRESSION: adequate alignment of the comminuted distal radial fracture. Overlying cast material obscures bony detail..\par \par \par \par \par EXAM: XR WRIST COMP MIN 3 VIEWS LT\par \par PROCEDURE DATE: 11/05/2021\par \par \par INTERPRETATION: CLINICAL INDICATION: Wrist pain following fall\par \par COMPARISON: 1/31/2008\par \par TECHNIQUE: Multiple views of the left wrist region performed.\par \par FINDINGS: Acute comminuted, impacted and intra-articular fracture deformity of the distal left radius identified. Acute fracture of the ulnar styloid process identified. Marked soft tissue swelling is evident. The radiocarpal joint space appears intact. Proximal carpal row articulation appears unremarkable. 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 Left glenohumeral articulation likely unremarkable. Elbow articulation cannot be assessed on this single radiograph.\par \par IMPRESSION: As above.\par \par --- End of Report ---\par \par \par \par EXAM: XR SHOULDER COMP MIN 2V LT\par \par PROCEDURE DATE: 11/05/2021\par \par \par INTERPRETATION: INDICATION: distal shoulder pain s/p fall outstretched arm\par \par PRIORS: None\par \par VIEWS: 2 views of the left shoulder region were performed.\par \par FINDINGS: The left glenohumeral articulation appears grossly intact. Acute fracture deformity of the proximal left humerus identified, which appears impacted. No widening of the acromioclavicular joint space. The left thoracic ribs appear unremarkable.\par \par IMPRESSION: Acute impacted fracture deformity proximal left humerus.\par \par --- End of Report ---\par \par \par \par \par

## 2021-11-17 ENCOUNTER — APPOINTMENT (OUTPATIENT)
Dept: SURGERY | Facility: HOSPITAL | Age: 76
End: 2021-11-17

## 2021-11-18 ENCOUNTER — APPOINTMENT (OUTPATIENT)
Dept: ORTHOPEDIC SURGERY | Facility: CLINIC | Age: 76
End: 2021-11-18
Payer: MEDICARE

## 2021-11-18 PROCEDURE — 73030 X-RAY EXAM OF SHOULDER: CPT | Mod: LT

## 2021-11-18 PROCEDURE — 73110 X-RAY EXAM OF WRIST: CPT | Mod: LT

## 2021-11-18 PROCEDURE — 99024 POSTOP FOLLOW-UP VISIT: CPT

## 2021-11-18 NOTE — DISCUSSION/SUMMARY
[de-identified] : The underlying pathophysiology was reviewed in great detail with the patient as well as the various treatment options, including ice, analgesics, NSAIDs, Physical therapy, steroid injections, immobilization, surgical fixation \par \par A short arm cast was trimmed in the clinic today. Remain in cast at all times. \par \par Remain in sling for immobilization of shoulder.\par \par Activity modifications and restrictions were discussed. \par \par FU 2 weeks for repeat xrays wrist \par \par All questions were answered, all alternatives discussed and the patient is in complete agreement with that plan. Follow-up appointment as instructed. Any issues and the patient will call or come in sooner.

## 2021-11-18 NOTE — PHYSICAL EXAM
[de-identified] : Left Upper Extremity\par o Shoulder :\par ¦ Inspection/Palpation : marked diffuse proximal humerus tenderness, with localized swelling and ecchymosis, no deformities\par ¦ Range of Motion : not assessed today due to fracture \par ¦ Strength : not assessed today due to fracture \par ¦ Stability : no joint instability on provocative testing\par ¦ Tests/Signs : not assessed today due to fracture\par \par o Wrist:\par ¦ Inspection/Palpation : cast in place\par ¦ Range of Motion : not assessed today due to cast\par ¦ Strength : extension, flexion, ulnar deviation and radial deviation:  not assessed today due to cast\par ¦ Stability :  not assessed today due to cast\par ¦ Tests/Signs : \par o Muscle Bulk : no atrophy\par o Sensation : sensation intact to light touch\par o Skin : no skin lesions or discoloration\par o Vascular Exam : no edema or cyanosis\par \par \par   [de-identified] : o Left Wrist: AP, lateral and oblique views of the wrist were obtained, there are no soft tissue abnormalities, alignment is normal, normal appearing joint spaces, normal bone density, no bony lesions  Acute comminuted, impacted and intra-articular fracture deformity of the distal left radius identified. Acute fracture of the ulnar styloid process identified, unchanged compared to previous films, \par \par \par o Left Shoulder: Grashey and outlet views were obtained, there are no soft tissue abnormalities, alignment is normal, impacted fracture of the surgical neck of the humerus with mild varus alignment, normal appearing joint spaces, normal bone density, no bony lesions \par \par  \par \par Patient comes to today's visit with outside imaging already performed. I reviewed the images in detail with the patient and discussed the findings as highlighted below. \par \par \par EXAM: XR WRIST POST RED AP LAT 2V LT\par \par PROCEDURE DATE: 11/05/2021\par \par \par INTERPRETATION: Radiographs of the LEFT wrist\par \par CLINICAL INFORMATION: Status post reduction Pain.\par \par TECHNIQUE: Frontal, oblique and lateral views of the wrist were obtained.\par \par FINDINGS: No prior examinations are available for review.\par \par The osseous structures of the wrist show adequate alignment of the comminuted distal radial fracture. Overlying cast material obscures bony detail.. Intercarpal spacing and alignment are preserved. The common carpal metacarpal and first carpal metacarpal joints appear intact.\par \par Mild soft tissue swelling is seen. No radiopaque foreign body is seen.\par \par IMPRESSION: adequate alignment of the comminuted distal radial fracture. Overlying cast material obscures bony detail..\par \par \par \par \par EXAM: XR WRIST COMP MIN 3 VIEWS LT\par \par PROCEDURE DATE: 11/05/2021\par \par \par INTERPRETATION: CLINICAL INDICATION: Wrist pain following fall\par \par COMPARISON: 1/31/2008\par \par TECHNIQUE: Multiple views of the left wrist region performed.\par \par FINDINGS: Acute comminuted, impacted and intra-articular fracture deformity of the distal left radius identified. Acute fracture of the ulnar styloid process identified. Marked soft tissue swelling is evident. The radiocarpal joint space appears intact. Proximal carpal row articulation appears unremarkable. 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 Left glenohumeral articulation likely unremarkable. Elbow articulation cannot be assessed on this single radiograph.\par \par IMPRESSION: As above.\par \par --- End of Report ---\par \par \par \par EXAM: XR SHOULDER COMP MIN 2V LT\par \par PROCEDURE DATE: 11/05/2021\par \par \par INTERPRETATION: INDICATION: distal shoulder pain s/p fall outstretched arm\par \par PRIORS: None\par \par VIEWS: 2 views of the left shoulder region were performed.\par \par FINDINGS: The left glenohumeral articulation appears grossly intact. Acute fracture deformity of the proximal left humerus identified, which appears impacted. No widening of the acromioclavicular joint space. The left thoracic ribs appear unremarkable.\par \par IMPRESSION: Acute impacted fracture deformity proximal left humerus.\par \par --- End of Report ---\par \par \par \par \par

## 2021-11-18 NOTE — HISTORY OF PRESENT ILLNESS
[de-identified] : Ms. CELSO MURPHY  is a 76 year  presenting to the office complaining of left wrist and shoulder pain She   presents to the office immobilized in a wrist splint and a sling. Patient reports pain began on 11/5/2021 after tripping and falling bracing her fall with her left arm. She went to [facility] ED after the fall where he had xrays revealing a distal radius fracture and a proximal humerus fracture.  The patient describes the pain as a dull aching, and occasionally sharp pain localized to the radial  aspect of her   left wrist that is intermittent in nature.  Her symptoms are exacerbated  with any movement of the wrist, hand, and gripping of the hand. Patient reports the pain is waking her   up at night.  Patient reports associated weakness. Denies numbness and tingling in the upper extremity. She is also complaining of left shoulder pain. \par

## 2021-12-02 ENCOUNTER — APPOINTMENT (OUTPATIENT)
Dept: ORTHOPEDIC SURGERY | Facility: CLINIC | Age: 76
End: 2021-12-02
Payer: MEDICARE

## 2021-12-02 PROCEDURE — 73110 X-RAY EXAM OF WRIST: CPT | Mod: LT

## 2021-12-02 PROCEDURE — 99024 POSTOP FOLLOW-UP VISIT: CPT

## 2021-12-04 NOTE — HISTORY OF PRESENT ILLNESS
[de-identified] : Ms. CELSO MURPHY  is a 76 year  presenting to the office complaining of left wrist and shoulder pain She   presents to the office immobilized in a wrist splint and a sling. Patient reports pain began on 11/5/2021 after tripping and falling bracing her fall with her left arm. [facility] had xrays revealing a distal radius fracture and a proximal humerus fracture.  The patient describes the pain as a dull aching, and occasionally sharp pain localized to the radial  aspect of her   left wrist that is intermittent in nature.  Her symptoms are exacerbated  with any movement of the wrist, hand, and gripping of the hand. Patient reports the pain is waking her   up at night.  Patient reports associated weakness. Denies numbness and tingling in the upper extremity. She is also complaining of left shoulder pain. \par

## 2021-12-04 NOTE — DISCUSSION/SUMMARY
[de-identified] : The underlying pathophysiology was reviewed in great detail with the patient as well as the various treatment options, including ice, analgesics, NSAIDs, Physical therapy, steroid injections, immobilization, surgical fixation \par \par Continue short arm cast\par \par She may begin gentle ROM exercises of the shoulder\par \par Activity modifications and restrictions were discussed. \par \par FU 2 weeks for repeat xrays wrist and shoulder. Will remove cast at this time. \par \par All questions were answered, all alternatives discussed and the patient is in complete agreement with that plan. Follow-up appointment as instructed. Any issues and the patient will call or come in sooner.

## 2021-12-04 NOTE — PHYSICAL EXAM
[de-identified] : Left Upper Extremity\par o Shoulder :\par ¦ Inspection/Palpation : moderate diffuse proximal humerus tenderness, mild localized swelling and resloved ecchymosis, no deformities\par ¦ Range of Motion : not assessed today due to fracture \par ¦ Strength : not assessed today due to fracture \par ¦ Stability : no joint instability on provocative testing\par ¦ Tests/Signs : not assessed today due to fracture\par \par o Wrist:\par ¦ Inspection/Palpation : cast in place\par ¦ Range of Motion : not assessed today due to cast\par ¦ Strength : extension, flexion, ulnar deviation and radial deviation:  not assessed today due to cast\par ¦ Stability :  not assessed today due to cast\par ¦ Tests/Signs : \par o Muscle Bulk : no atrophy\par o Sensation : sensation intact to light touch\par o Skin : no skin lesions or discoloration\par o Vascular Exam : no edema or cyanosis\par \par \par   [de-identified] : o Left Wrist: AP, lateral and oblique views of the wrist were obtained, there are no soft tissue abnormalities, alignment is normal, normal appearing joint spaces, normal bone density, no bony lesions  Acute comminuted, impacted and intra-articular fracture deformity of the distal left radius identified. Acute fracture of the ulnar styloid process identified, unchanged compared to previous films, \par  \par \par Patient comes to today's visit with outside imaging already performed. I reviewed the images in detail with the patient and discussed the findings as highlighted below. \par \par \par EXAM: XR WRIST POST RED AP LAT 2V LT\par \par PROCEDURE DATE: 11/05/2021\par \par \par INTERPRETATION: Radiographs of the LEFT wrist\par \par CLINICAL INFORMATION: Status post reduction Pain.\par \par TECHNIQUE: Frontal, oblique and lateral views of the wrist were obtained.\par \par FINDINGS: No prior examinations are available for review.\par \par The osseous structures of the wrist show adequate alignment of the comminuted distal radial fracture. Overlying cast material obscures bony detail.. Intercarpal spacing and alignment are preserved. The common carpal metacarpal and first carpal metacarpal joints appear intact.\par \par Mild soft tissue swelling is seen. No radiopaque foreign body is seen.\par \par IMPRESSION: adequate alignment of the comminuted distal radial fracture. Overlying cast material obscures bony detail..\par \par \par \par \par EXAM: XR WRIST COMP MIN 3 VIEWS LT\par \par PROCEDURE DATE: 11/05/2021\par \par \par INTERPRETATION: CLINICAL INDICATION: Wrist pain following fall\par \par COMPARISON: 1/31/2008\par \par TECHNIQUE: Multiple views of the left wrist region performed.\par \par FINDINGS: Acute comminuted, impacted and intra-articular fracture deformity of the distal left radius identified. Acute fracture of the ulnar styloid process identified. Marked soft tissue swelling is evident. The radiocarpal joint space appears intact. Proximal carpal row articulation appears unremarkable. 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 Left glenohumeral articulation likely unremarkable. Elbow articulation cannot be assessed on this single radiograph.\par \par IMPRESSION: As above.\par \par --- End of Report ---\par \par \par \par EXAM: XR SHOULDER COMP MIN 2V LT\par \par PROCEDURE DATE: 11/05/2021\par \par \par INTERPRETATION: INDICATION: distal shoulder pain s/p fall outstretched arm\par \par PRIORS: None\par \par VIEWS: 2 views of the left shoulder region were performed.\par \par FINDINGS: The left glenohumeral articulation appears grossly intact. Acute fracture deformity of the proximal left humerus identified, which appears impacted. No widening of the acromioclavicular joint space. The left thoracic ribs appear unremarkable.\par \par IMPRESSION: Acute impacted fracture deformity proximal left humerus.\par \par --- End of Report ---\par \par \par \par \par

## 2021-12-16 ENCOUNTER — APPOINTMENT (OUTPATIENT)
Dept: ORTHOPEDIC SURGERY | Facility: CLINIC | Age: 76
End: 2021-12-16
Payer: MEDICARE

## 2021-12-16 PROCEDURE — 73110 X-RAY EXAM OF WRIST: CPT | Mod: LT

## 2021-12-16 PROCEDURE — 73030 X-RAY EXAM OF SHOULDER: CPT | Mod: LT

## 2021-12-16 PROCEDURE — 99024 POSTOP FOLLOW-UP VISIT: CPT

## 2021-12-17 NOTE — HISTORY OF PRESENT ILLNESS
[de-identified] : Ms. CELSO MURPHY  is a 76 year  presenting to the office complaining of left wrist and shoulder pain She   presents to the office immobilized in a short arm cast and a sling. Patient reports pain began on 11/5/2021 after tripping and falling bracing her fall with her left arm. She went to [facility] ED after the fall where he had xrays revealing a distal radius fracture and a proximal humerus fracture.  The patient describes the pain as a dull aching, and occasionally sharp pain localized to the radial  aspect of her   left wrist that is intermittent in nature.  Her symptoms are exacerbated  with any movement of the wrist, hand, and gripping of the hand. Patient reports the pain is waking her   up at night.  Patient reports associated weakness. Denies numbness and tingling in the upper extremity. She is also complaining of left shoulder pain. \par

## 2021-12-17 NOTE — PHYSICAL EXAM
[de-identified] : Left Upper Extremity\par o Shoulder :\par ¦ Inspection/Palpation : mild diffuse proximal humerus tenderness, mild localized swelling and resolved ecchymosis, no deformities\par ¦ Range of Motion : passive elevation to 90 degrees, ER to 20 degrees\par ¦ Strength : 3/5 ER, 5/5 IR\par ¦ Stability : no joint instability on provocative testing\par ¦ Tests/Signs : not assessed today due to fracture\par \par o Wrist:\par ¦ Inspection/Palpation : cast in place which was removed, mild radial deviation deformity, mild tenderness to palpation\par ¦ Range of Motion : moderate wrist stiffness on ROM\par ¦ Strength : extension, flexion, ulnar deviation and radial deviation: 4/5\par ¦ Stability :  no instability\par ¦ Tests/Signs : \par o Muscle Bulk : no atrophy\par o Sensation : sensation intact to light touch\par o Skin : no skin lesions or discoloration\par o Vascular Exam : no edema or cyanosis\par \par \par   [de-identified] : o Left Wrist: AP, lateral and oblique views of the wrist were obtained, there are no soft tissue abnormalities, comminuted, impacted intra-articular fracture of the distal left radius  with radial shortening, fracture healing well in overall acceptable alignment, Acute fracture of the ulnar styloid process identified, unchanged compared to previous films\par \par \par o Left Shoulder: Grashey and outlet views were obtained, there are no soft tissue abnormalities, alignment is normal, impacted fracture of the surgical neck of the humerus with mild varus alignment, fracture healing well with increased callus formation,  normal appearing joint spaces, normal bone density, no bony lesions \par \par ----------------------------------------------------------------------------------------------------------------------------------------------------------------------------------- \par

## 2021-12-17 NOTE — DISCUSSION/SUMMARY
[de-identified] : The underlying pathophysiology was reviewed in great detail with the patient as well as the various treatment options, including ice, analgesics, NSAIDs, Physical therapy, steroid injections, immobilization, surgical fixation \par \par Short arm cast removed. D/C sling as tolerated. \par \par Continue home exercise program. \par \par A prescription for Physical Therapy was provided.\par \par Activity modifications and restrictions were discussed. \par \par FU 6 weeks for repeat xrays wrist and shoulder. \par \par All questions were answered, all alternatives discussed and the patient is in complete agreement with that plan. Follow-up appointment as instructed. Any issues and the patient will call or come in sooner.

## 2022-01-13 ENCOUNTER — APPOINTMENT (OUTPATIENT)
Dept: ORTHOPEDIC SURGERY | Facility: CLINIC | Age: 77
End: 2022-01-13
Payer: MEDICARE

## 2022-01-13 VITALS — BODY MASS INDEX: 26.22 KG/M2 | HEIGHT: 63 IN | WEIGHT: 148 LBS

## 2022-01-13 DIAGNOSIS — S52.532D COLLES' FRACTURE OF LEFT RADIUS, SUBSEQUENT ENCOUNTER FOR CLOSED FRACTURE WITH ROUTINE HEALING: ICD-10-CM

## 2022-01-13 DIAGNOSIS — S42.292D OTHER DISPLACED FRACTURE OF UPPER END OF LEFT HUMERUS, SUBSEQUENT ENCOUNTER FOR FRACTURE WITH ROUTINE HEALING: ICD-10-CM

## 2022-01-13 PROCEDURE — 73110 X-RAY EXAM OF WRIST: CPT | Mod: LT

## 2022-01-13 PROCEDURE — 73030 X-RAY EXAM OF SHOULDER: CPT | Mod: LT

## 2022-01-13 PROCEDURE — 99214 OFFICE O/P EST MOD 30 MIN: CPT | Mod: 24

## 2022-01-14 PROBLEM — S42.292D OTHER CLOSED DISPLACED FRACTURE OF PROXIMAL END OF LEFT HUMERUS WITH ROUTINE HEALING, SUBSEQUENT ENCOUNTER: Status: ACTIVE | Noted: 2021-11-18

## 2022-01-14 PROBLEM — S52.532D CLOSED COLLES' FRACTURE OF LEFT RADIUS WITH ROUTINE HEALING, SUBSEQUENT ENCOUNTER: Status: ACTIVE | Noted: 2021-11-11

## 2022-01-14 NOTE — DISCUSSION/SUMMARY
[de-identified] : The underlying pathophysiology was reviewed in great detail with the patient as well as the various treatment options, including ice, analgesics, NSAIDs, Physical therapy, steroid injections, immobilization, surgical fixation \par \par Continue home exercise program. \par \par A prescription for Physical Therapy was provided.\par \par Activity modifications and restrictions were discussed. \par \par FU 6 weeks \par \par All questions were answered, all alternatives discussed and the patient is in complete agreement with that plan. Follow-up appointment as instructed. Any issues and the patient will call or come in sooner.

## 2022-01-14 NOTE — PHYSICAL EXAM
[de-identified] : Left Upper Extremity\par o Shoulder :\par ¦ Inspection/Palpation : minimal diffuse proximal humerus tenderness, mild localized swelling and resolved ecchymosis, no deformities\par ¦ Range of Motion : PASSIVE FORWARD ELEVATION: Measured at 95 degreesACTIVE FORWARD ELEVATION: Measured at 75 degrees, ACTIVE EXTERNAL ROTATION: Measured at 50 degrees, ACTIVE INTERNAL ROTATION: Measured at T12\par ¦ Strength : supraspinatus 5/5, internal rotation 5/5, external rotation 5/5 \par ¦ Stability : no joint instability on provocative testing\par ¦ Tests/Signs : not assessed today due to fracture\par \par o Wrist:\par ¦ Inspection/Palpation : mild radial deviation deformity, no tenderness to palpation, no swelling. \par ¦ Range of Motion : full and pain free range of motion. \par ¦ Strength : extension, flexion, ulnar deviation and radial deviation: 5/5\par ¦ Stability :  no instability\par ¦ Tests/Signs : \par o Muscle Bulk : no atrophy\par o Sensation : sensation intact to light touch\par o Skin : no skin lesions or discoloration\par o Vascular Exam : no edema or cyanosis\par \par \par   [de-identified] : o Left Wrist: AP, lateral and oblique views of the wrist were obtained, there are no soft tissue abnormalities, comminuted, impacted intra-articular fracture of the distal left radius  with radial shortening, fracture healing well in overall acceptable alignment, Acute fracture of the ulnar styloid process identified, well healed \par \par \par o Left Shoulder: Grashey and outlet views were obtained, there are no soft tissue abnormalities, alignment is normal, impacted fracture of the surgical neck of the humerus with mild varus alignment, fracture well healed  normal appearing joint spaces, normal bone density, no bony lesions \par \par ----------------------------------------------------------------------------------------------------------------------------------------------------------------------------------- \par

## 2022-01-14 NOTE — HISTORY OF PRESENT ILLNESS
[de-identified] : Ms. CELSO MURPHY  is a 76 year  presenting to the office complaining of left wrist and shoulder pain She   presents to the office immobilized in a short arm cast and a sling. Patient reports pain began on 11/5/2021 after tripping and falling bracing her fall with her left arm. She went to [facility] ED after the fall where he had xrays revealing a distal radius fracture and a proximal humerus fracture.  The patient reports that she is feeling much better.\par

## 2022-03-18 ENCOUNTER — OUTPATIENT (OUTPATIENT)
Dept: OUTPATIENT SERVICES | Facility: HOSPITAL | Age: 77
LOS: 1 days | End: 2022-03-18
Payer: MEDICARE

## 2022-03-18 VITALS
OXYGEN SATURATION: 96 % | WEIGHT: 134.04 LBS | RESPIRATION RATE: 18 BRPM | DIASTOLIC BLOOD PRESSURE: 65 MMHG | HEIGHT: 63 IN | HEART RATE: 74 BPM | SYSTOLIC BLOOD PRESSURE: 175 MMHG | TEMPERATURE: 98 F

## 2022-03-18 DIAGNOSIS — Z90.710 ACQUIRED ABSENCE OF BOTH CERVIX AND UTERUS: Chronic | ICD-10-CM

## 2022-03-18 DIAGNOSIS — Z01.818 ENCOUNTER FOR OTHER PREPROCEDURAL EXAMINATION: ICD-10-CM

## 2022-03-18 DIAGNOSIS — K44.9 DIAPHRAGMATIC HERNIA WITHOUT OBSTRUCTION OR GANGRENE: ICD-10-CM

## 2022-03-18 DIAGNOSIS — Z29.9 ENCOUNTER FOR PROPHYLACTIC MEASURES, UNSPECIFIED: ICD-10-CM

## 2022-03-18 DIAGNOSIS — Z98.890 OTHER SPECIFIED POSTPROCEDURAL STATES: Chronic | ICD-10-CM

## 2022-03-18 LAB
ANION GAP SERPL CALC-SCNC: 13 MMOL/L — SIGNIFICANT CHANGE UP (ref 5–17)
BLD GP AB SCN SERPL QL: NEGATIVE — SIGNIFICANT CHANGE UP
BUN SERPL-MCNC: 17 MG/DL — SIGNIFICANT CHANGE UP (ref 7–23)
CALCIUM SERPL-MCNC: 10.4 MG/DL — SIGNIFICANT CHANGE UP (ref 8.4–10.5)
CHLORIDE SERPL-SCNC: 102 MMOL/L — SIGNIFICANT CHANGE UP (ref 96–108)
CO2 SERPL-SCNC: 25 MMOL/L — SIGNIFICANT CHANGE UP (ref 22–31)
CREAT SERPL-MCNC: 0.83 MG/DL — SIGNIFICANT CHANGE UP (ref 0.5–1.3)
EGFR: 73 ML/MIN/1.73M2 — SIGNIFICANT CHANGE UP
GLUCOSE SERPL-MCNC: 79 MG/DL — SIGNIFICANT CHANGE UP (ref 70–99)
HCT VFR BLD CALC: 42.4 % — SIGNIFICANT CHANGE UP (ref 34.5–45)
HGB BLD-MCNC: 14 G/DL — SIGNIFICANT CHANGE UP (ref 11.5–15.5)
MCHC RBC-ENTMCNC: 31 PG — SIGNIFICANT CHANGE UP (ref 27–34)
MCHC RBC-ENTMCNC: 33 GM/DL — SIGNIFICANT CHANGE UP (ref 32–36)
MCV RBC AUTO: 93.8 FL — SIGNIFICANT CHANGE UP (ref 80–100)
NRBC # BLD: 0 /100 WBCS — SIGNIFICANT CHANGE UP (ref 0–0)
PLATELET # BLD AUTO: 232 K/UL — SIGNIFICANT CHANGE UP (ref 150–400)
POTASSIUM SERPL-MCNC: 3.7 MMOL/L — SIGNIFICANT CHANGE UP (ref 3.5–5.3)
POTASSIUM SERPL-SCNC: 3.7 MMOL/L — SIGNIFICANT CHANGE UP (ref 3.5–5.3)
RBC # BLD: 4.52 M/UL — SIGNIFICANT CHANGE UP (ref 3.8–5.2)
RBC # FLD: 12.1 % — SIGNIFICANT CHANGE UP (ref 10.3–14.5)
RH IG SCN BLD-IMP: POSITIVE — SIGNIFICANT CHANGE UP
SODIUM SERPL-SCNC: 140 MMOL/L — SIGNIFICANT CHANGE UP (ref 135–145)
WBC # BLD: 7.11 K/UL — SIGNIFICANT CHANGE UP (ref 3.8–10.5)
WBC # FLD AUTO: 7.11 K/UL — SIGNIFICANT CHANGE UP (ref 3.8–10.5)

## 2022-03-18 PROCEDURE — 86850 RBC ANTIBODY SCREEN: CPT

## 2022-03-18 PROCEDURE — 86901 BLOOD TYPING SEROLOGIC RH(D): CPT

## 2022-03-18 PROCEDURE — 80048 BASIC METABOLIC PNL TOTAL CA: CPT

## 2022-03-18 PROCEDURE — 36415 COLL VENOUS BLD VENIPUNCTURE: CPT

## 2022-03-18 PROCEDURE — 86900 BLOOD TYPING SEROLOGIC ABO: CPT

## 2022-03-18 PROCEDURE — 85027 COMPLETE CBC AUTOMATED: CPT

## 2022-03-18 PROCEDURE — G0463: CPT

## 2022-03-18 RX ORDER — CHLORHEXIDINE GLUCONATE 213 G/1000ML
1 SOLUTION TOPICAL ONCE
Refills: 0 | Status: DISCONTINUED | OUTPATIENT
Start: 2022-04-07 | End: 2022-04-08

## 2022-03-18 RX ORDER — APREPITANT 80 MG/1
40 CAPSULE ORAL ONCE
Refills: 0 | Status: COMPLETED | OUTPATIENT
Start: 2022-04-07 | End: 2022-04-07

## 2022-03-18 RX ORDER — LIDOCAINE HCL 20 MG/ML
0.2 VIAL (ML) INJECTION ONCE
Refills: 0 | Status: DISCONTINUED | OUTPATIENT
Start: 2022-04-07 | End: 2022-04-07

## 2022-03-18 RX ORDER — CEFAZOLIN SODIUM 1 G
2000 VIAL (EA) INJECTION ONCE
Refills: 0 | Status: DISCONTINUED | OUTPATIENT
Start: 2022-04-07 | End: 2022-04-08

## 2022-03-18 RX ORDER — SODIUM CHLORIDE 9 MG/ML
3 INJECTION INTRAMUSCULAR; INTRAVENOUS; SUBCUTANEOUS EVERY 8 HOURS
Refills: 0 | Status: DISCONTINUED | OUTPATIENT
Start: 2022-04-07 | End: 2022-04-07

## 2022-03-18 NOTE — H&P PST ADULT - BREASTS
06:16  Spoke with pastoral care to place pt on the list for communion today per pt request.  Lauren stated pt will be added to the list, and she will be able to receive communion. Shawna Cotto RN    08:15  Bedside shift change report given to [address] [place] [address] (oncoming nurse) by Shawna Cotto RN (offgoing nurse). Report included the following information SBAR, Kardex, MAR, Recent Results and Cardiac Rhythm NSR. No masses; no nipple discharge

## 2022-03-18 NOTE — H&P PST ADULT - PROBLEM SELECTOR PLAN 1
Pt is scheduled for a Laparoscopic Paraesophageal Hernia Repair with Dr. Donahue on 3/29/22  Covid PCR test scheduled for 3/26/22 at [facility]  CBC, BMP and T/S ordered and obtained at [facility]  ABO on admit  Emend ordered pre-operatively DOS for history of PONV  Pending M/E with PCP on 3/23/22 Pt is scheduled for a Laparoscopic Paraesophageal Hernia Repair with Dr. Donahue on 3/29/22  Covid PCR test scheduled for 3/26/22 at [facility]  CBC, BMP and T/S ordered and obtained at [facility]  ABO on admit  Emend ordered pre-operatively DOS for history of PONV  Pending M/E with PCP on 3/23/22  Incentive spirometer instructions provided to pt with teach back demonstration

## 2022-03-18 NOTE — H&P PST ADULT - FALL HARM RISK - UNIVERSAL INTERVENTIONS
Bed in lowest position, wheels locked, appropriate side rails in place/Call bell, personal items and telephone in reach/Instruct patient to call for assistance before getting out of bed or chair/Non-slip footwear when patient is out of bed/[place] to call system/Physically safe environment - no spills, clutter or unnecessary equipment/Purposeful Proactive Rounding/Room/bathroom lighting operational, light cord in reach

## 2022-03-18 NOTE — H&P PST ADULT - NSICDXPASTMEDICALHX_GEN_ALL_CORE_FT
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     PAST MEDICAL HISTORY:  Blood clot in vein Superficial blood clot in RLE - No treatment with AC - s/p Duplex B/L LE - Managed by PCP - 5 years ago    Diaphragmatic hernia without obstruction or gangrene     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

## 2022-03-18 NOTE — H&P PST ADULT - HISTORY OF PRESENT ILLNESS
77 year old female with PMH HTN and ROBIN 2/2 Endometriosis reports c/o indigestion for over 25 years with retrosternal discomfort. Over the past year, she has been having exacerbations of these symptoms. She has excessive burping after meals and retrosternal fullness after meals. She denies any history of choking or nocturnal regurgitation. Endoscopy from 10/13/21 demonstrates a j-shaped stomach and pylorus at 40 cm from the incisors, consistent with an intra-thoracic stomach. An incidental asymptomatic LLQ interstitial hernia was also noted. Pt now presents to [facility] for scheduled Laparoscopic Paraesophageal hernia repair with Dr. Donahue on 3/29/22.    **Of note, procedure was originally scheduled for 11/17/2021 however, patient fell on the stairs and went to [facility] with left shoulder and left wrist fractures. Procedure was postponed.**    Covid vaccine Moderna 2nd dose received 4/3/21; Moderna Booster received 10/28/21  Covid PCR test scheduled for 3/26/22 at [facility]  Denies Recent travel, Exposure or Covid symptoms 77 year old female with PMH HTN and ROBIN 2/2 Endometriosis reports c/o indigestion for over 25 years with retrosternal discomfort. Over the past year, she has been having exacerbations of these symptoms. She has excessive burping after meals and retrosternal fullness after meals. She denies any history of choking or nocturnal regurgitation. Endoscopy from 10/13/21 demonstrates a j-shaped stomach and pylorus at 40 cm from the incisors, consistent with an intra-thoracic stomach. An incidental asymptomatic LLQ interstitial hernia was also noted. Pt now presents to [facility] for scheduled Laparoscopic Paraesophageal hernia repair with Dr. Donahue on 3/29/22.    **Of note, procedure was originally scheduled for this procedure on 11/17/2021 however, the patient fell up the stairs and went to [facility] with left shoulder and left wrist fractures. Procedure was postponed.**    Covid vaccine Moderna 2nd dose received 4/3/21; Moderna Booster received 10/28/21  Covid PCR test scheduled for 3/26/22 at [facility]  Denies Recent travel, Exposure or Covid symptoms 77 year old female with PMH HTN and ROBIN 2/2 Endometriosis reports c/o indigestion for over 25 years with retrosternal discomfort. Over the past year, she has been having exacerbations of these symptoms. She has excessive burping and retrosternal fullness after meals. She denies any history of choking or nocturnal regurgitation. Endoscopy from 10/13/21 demonstrates a j-shaped stomach and pylorus at 40 cm from the incisors, consistent with an intra-thoracic stomach. An incidental asymptomatic LLQ interstitial hernia was also noted. Pt now presents to [facility] for scheduled Laparoscopic Paraesophageal hernia repair with Dr. Donahue on 3/29/22.    **Of note, procedure was originally scheduled for this procedure on 11/17/2021 however, the patient fell up the stairs and went to [facility] with left shoulder and left wrist fractures. Procedure was postponed.**    Covid vaccine Moderna 2nd dose received 4/3/21; Moderna Booster received 10/28/21  Covid PCR test scheduled for 3/26/22 at [facility]  Denies Recent travel, Exposure or Covid symptoms 77 year old female with PMH HTN and ROBIN 2/2 Endometriosis reports c/o indigestion for over 25 years with retrosternal discomfort. Over the past year, she has been having exacerbations of these symptoms. She has excessive burping and retrosternal fullness after meals. She denies any history of choking or nocturnal regurgitation. Endoscopy from 10/13/21 demonstrates a j-shaped stomach and pylorus at 40 cm from the incisors, consistent with an intra-thoracic stomach. An incidental asymptomatic LLQ interstitial hernia was also noted. Pt now presents to [facility] for scheduled Laparoscopic Paraesophageal hernia repair with Dr. Donahue on 3/29/22.    **Of note, patient was originally scheduled for this procedure on 11/17/2021 however, the patient fell up the stairs and went to [facility] with left shoulder and left wrist fractures. Procedure was postponed.**    Covid vaccine Moderna 2nd dose received 4/3/21; Moderna Booster received 10/28/21  Covid PCR test scheduled for 3/26/22 at [facility]  Denies Recent travel, Exposure or Covid symptoms

## 2022-03-18 NOTE — H&P PST ADULT - FALL HARM RISK - HARM RISK INTERVENTIONS

## 2022-03-18 NOTE — H&P PST ADULT - NSICDXPASTSURGICALHX_GEN_ALL_CORE_FT
PAST SURGICAL HISTORY:  S/P excision of lipoma on neck s/p excision under anesthesia around 1990 - benign    S/P ROBIN (total abdominal hysterectomy) 1984 2/2 Endometriosis

## 2022-03-18 NOTE — H&P PST ADULT - ASSESSMENT
DANAI VTE 2.0 SCORE [CLOT updated 2019]    AGE RELATED RISK FACTORS                                                       MOBILITY RELATED FACTORS  [ ] Age 41-60 years                                            (1 Point)                    [ ] Bed rest                                                        (1 Point)  [ ] Age: 61-74 years                                           (2 Points)                  [ ] Plaster cast                                                   (2 Points)  [ ] Age= 75 years                                              (3 Points)                    [ ] Bed bound for more than 72 hours                 (2 Points)    DISEASE RELATED RISK FACTORS                                               GENDER SPECIFIC FACTORS  [ ] Edema in the lower extremities                       (1 Point)              [ ] Pregnancy                                                     (1 Point)  [ ] Varicose veins                                               (1 Point)                     [ ] Post-partum < 6 weeks                                   (1 Point)             [ ] BMI > 25 Kg/m2                                            (1 Point)                     [ ] Hormonal therapy  or oral contraception          (1 Point)                 [ ] Sepsis (in the previous month)                        (1 Point)               [ ] History of pregnancy complications                 (1 point)  [ ] Pneumonia or serious lung disease                                               [ ] Unexplained or recurrent                     (1 Point)           (in the previous month)                               (1 Point)  [ ] Abnormal pulmonary function test                     (1 Point)                 SURGERY RELATED RISK FACTORS  [ ] Acute myocardial infarction                              (1 Point)               [ ]  Section                                             (1 Point)  [ ] Congestive heart failure (in the previous month)  (1 Point)      [ ] Minor surgery                                                  (1 Point)   [ ] Inflammatory bowel disease                             (1 Point)               [ ] Arthroscopic surgery                                        (2 Points)  [ ] Central venous access                                      (2 Points)                [ ] General surgery lasting more than 45 minutes (2 points)  [ ] Malignancy- Present or previous                   (2 Points)                [ ] Elective arthroplasty                                         (5 points)    [ ] Stroke (in the previous month)                          (5 Points)                                                                                                                                                           HEMATOLOGY RELATED FACTORS                                                 TRAUMA RELATED RISK FACTORS  [ ] Prior episodes of VTE                                     (3 Points)                [ ] Fracture of the hip, pelvis, or leg                       (5 Points)  [ ] Positive family history for VTE                         (3 Points)             [ ] Acute spinal cord injury (in the previous month)  (5 Points)  [ ] Prothrombin 58315 A                                     (3 Points)               [ ] Paralysis  (less than 1 month)                             (5 Points)  [ ] Factor V Leiden                                             (3 Points)                  [ ] Multiple Trauma within 1 month                        (5 Points)  [ ] Lupus anticoagulants                                     (3 Points)                                                           [ ] Anticardiolipin antibodies                               (3 Points)                                                       [ ] High homocysteine in the blood                      (3 Points)                                             [ ] Other congenital or acquired thrombophilia      (3 Points)                                                [ ] Heparin induced thrombocytopenia                  (3 Points)                                     Total Score [          ] DANAI VTE 2.0 SCORE [CLOT updated 2019]    AGE RELATED RISK FACTORS                                                       MOBILITY RELATED FACTORS  [ ] Age 41-60 years                                            (1 Point)                    [ ] Bed rest                                                        (1 Point)  [ ] Age: 61-74 years                                           (2 Points)                  [ ] Plaster cast                                                   (2 Points)  [X ] Age= 75 years                                              (3 Points)                    [ ] Bed bound for more than 72 hours                 (2 Points)    DISEASE RELATED RISK FACTORS                                               GENDER SPECIFIC FACTORS  [ ] Edema in the lower extremities                       (1 Point)              [ ] Pregnancy                                                     (1 Point)  [ ] Varicose veins                                               (1 Point)                     [ ] Post-partum < 6 weeks                                   (1 Point)             [ ] BMI > 25 Kg/m2                                            (1 Point)                     [ ] Hormonal therapy  or oral contraception          (1 Point)                 [ ] Sepsis (in the previous month)                        (1 Point)               [ ] History of pregnancy complications                 (1 point)  [ ] Pneumonia or serious lung disease                                               [ ] Unexplained or recurrent                     (1 Point)           (in the previous month)                               (1 Point)  [ ] Abnormal pulmonary function test                     (1 Point)                 SURGERY RELATED RISK FACTORS  [ ] Acute myocardial infarction                              (1 Point)               [ ]  Section                                             (1 Point)  [ ] Congestive heart failure (in the previous month)  (1 Point)      [ ] Minor surgery                                                  (1 Point)   [ ] Inflammatory bowel disease                             (1 Point)               [ ] Arthroscopic surgery                                        (2 Points)  [ ] Central venous access                                      (2 Points)                [X ] General surgery lasting more than 45 minutes (2 points)  [ ] Malignancy- Present or previous                   (2 Points)                [ ] Elective arthroplasty                                         (5 points)    [ ] Stroke (in the previous month)                          (5 Points)                                                                                                                                                           HEMATOLOGY RELATED FACTORS                                                 TRAUMA RELATED RISK FACTORS  [ ] Prior episodes of VTE                                     (3 Points)                [ ] Fracture of the hip, pelvis, or leg                       (5 Points)  [ ] Positive family history for VTE                         (3 Points)             [ ] Acute spinal cord injury (in the previous month)  (5 Points)  [ ] Prothrombin 32070 A                                     (3 Points)               [ ] Paralysis  (less than 1 month)                             (5 Points)  [ ] Factor V Leiden                                             (3 Points)                  [ ] Multiple Trauma within 1 month                        (5 Points)  [ ] Lupus anticoagulants                                     (3 Points)                                                           [ ] Anticardiolipin antibodies                               (3 Points)                                                       [ ] High homocysteine in the blood                      (3 Points)                                             [ ] Other congenital or acquired thrombophilia      (3 Points)                                                [ ] Heparin induced thrombocytopenia                  (3 Points)                                     Total Score [      5    ]

## 2022-04-02 ENCOUNTER — OUTPATIENT (OUTPATIENT)
Dept: OUTPATIENT SERVICES | Facility: HOSPITAL | Age: 77
LOS: 1 days | End: 2022-04-02
Payer: MEDICARE

## 2022-04-02 DIAGNOSIS — Z11.52 ENCOUNTER FOR SCREENING FOR COVID-19: ICD-10-CM

## 2022-04-02 DIAGNOSIS — Z98.890 OTHER SPECIFIED POSTPROCEDURAL STATES: Chronic | ICD-10-CM

## 2022-04-02 DIAGNOSIS — Z90.710 ACQUIRED ABSENCE OF BOTH CERVIX AND UTERUS: Chronic | ICD-10-CM

## 2022-04-02 LAB — SARS-COV-2 RNA SPEC QL NAA+PROBE: SIGNIFICANT CHANGE UP

## 2022-04-02 PROCEDURE — U0005: CPT

## 2022-04-02 PROCEDURE — U0003: CPT

## 2022-04-02 PROCEDURE — C9803: CPT

## 2022-04-06 ENCOUNTER — TRANSCRIPTION ENCOUNTER (OUTPATIENT)
Age: 77
End: 2022-04-06

## 2022-04-07 ENCOUNTER — RESULT REVIEW (OUTPATIENT)
Age: 77
End: 2022-04-07

## 2022-04-07 ENCOUNTER — OUTPATIENT (OUTPATIENT)
Dept: INPATIENT UNIT | Facility: HOSPITAL | Age: 77
LOS: 1 days | End: 2022-04-07
Payer: MEDICARE

## 2022-04-07 ENCOUNTER — APPOINTMENT (OUTPATIENT)
Dept: SURGERY | Facility: HOSPITAL | Age: 77
End: 2022-04-07
Payer: MEDICARE

## 2022-04-07 VITALS
HEART RATE: 81 BPM | SYSTOLIC BLOOD PRESSURE: 169 MMHG | RESPIRATION RATE: 18 BRPM | OXYGEN SATURATION: 96 % | TEMPERATURE: 98 F | WEIGHT: 134.04 LBS | HEIGHT: 63 IN | DIASTOLIC BLOOD PRESSURE: 74 MMHG

## 2022-04-07 DIAGNOSIS — K44.9 DIAPHRAGMATIC HERNIA WITHOUT OBSTRUCTION OR GANGRENE: ICD-10-CM

## 2022-04-07 DIAGNOSIS — Z98.890 OTHER SPECIFIED POSTPROCEDURAL STATES: Chronic | ICD-10-CM

## 2022-04-07 DIAGNOSIS — Z90.710 ACQUIRED ABSENCE OF BOTH CERVIX AND UTERUS: Chronic | ICD-10-CM

## 2022-04-07 LAB — RH IG SCN BLD-IMP: POSITIVE — SIGNIFICANT CHANGE UP

## 2022-04-07 PROCEDURE — 88302 TISSUE EXAM BY PATHOLOGIST: CPT | Mod: 26

## 2022-04-07 PROCEDURE — 43281 LAP PARAESOPHAG HERN REPAIR: CPT

## 2022-04-07 PROCEDURE — 71045 X-RAY EXAM CHEST 1 VIEW: CPT | Mod: 26

## 2022-04-07 PROCEDURE — 43281 LAP PARAESOPHAG HERN REPAIR: CPT | Mod: 82

## 2022-04-07 DEVICE — SURGICEL POWDER 3 GRAMS: Type: IMPLANTABLE DEVICE | Status: FUNCTIONAL

## 2022-04-07 DEVICE — VISTASEAL FIBRIN HUMAN 4ML: Type: IMPLANTABLE DEVICE | Status: FUNCTIONAL

## 2022-04-07 RX ORDER — OFLOXACIN 0.3 %
1 DROPS OPHTHALMIC (EYE)
Refills: 0 | Status: DISCONTINUED | OUTPATIENT
Start: 2022-04-07 | End: 2022-04-08

## 2022-04-07 RX ORDER — ACETAMINOPHEN 500 MG
1000 TABLET ORAL ONCE
Refills: 0 | Status: COMPLETED | OUTPATIENT
Start: 2022-04-08 | End: 2022-04-08

## 2022-04-07 RX ORDER — HYDROMORPHONE HYDROCHLORIDE 2 MG/ML
0.5 INJECTION INTRAMUSCULAR; INTRAVENOUS; SUBCUTANEOUS EVERY 4 HOURS
Refills: 0 | Status: DISCONTINUED | OUTPATIENT
Start: 2022-04-07 | End: 2022-04-08

## 2022-04-07 RX ORDER — ONDANSETRON 8 MG/1
4 TABLET, FILM COATED ORAL ONCE
Refills: 0 | Status: DISCONTINUED | OUTPATIENT
Start: 2022-04-07 | End: 2022-04-07

## 2022-04-07 RX ORDER — ENOXAPARIN SODIUM 100 MG/ML
40 INJECTION SUBCUTANEOUS EVERY 24 HOURS
Refills: 0 | Status: DISCONTINUED | OUTPATIENT
Start: 2022-04-08 | End: 2022-04-08

## 2022-04-07 RX ORDER — HYDROMORPHONE HYDROCHLORIDE 2 MG/ML
1 INJECTION INTRAMUSCULAR; INTRAVENOUS; SUBCUTANEOUS EVERY 4 HOURS
Refills: 0 | Status: DISCONTINUED | OUTPATIENT
Start: 2022-04-07 | End: 2022-04-08

## 2022-04-07 RX ORDER — ERYTHROMYCIN BASE 5 MG/GRAM
1 OINTMENT (GRAM) OPHTHALMIC (EYE) AT BEDTIME
Refills: 0 | Status: DISCONTINUED | OUTPATIENT
Start: 2022-04-07 | End: 2022-04-08

## 2022-04-07 RX ORDER — ACETAMINOPHEN 500 MG
1000 TABLET ORAL ONCE
Refills: 0 | Status: DISCONTINUED | OUTPATIENT
Start: 2022-04-08 | End: 2022-04-08

## 2022-04-07 RX ORDER — PANTOPRAZOLE SODIUM 20 MG/1
40 TABLET, DELAYED RELEASE ORAL DAILY
Refills: 0 | Status: DISCONTINUED | OUTPATIENT
Start: 2022-04-07 | End: 2022-04-08

## 2022-04-07 RX ORDER — ACETAMINOPHEN 500 MG
1000 TABLET ORAL ONCE
Refills: 0 | Status: COMPLETED | OUTPATIENT
Start: 2022-04-07 | End: 2022-04-07

## 2022-04-07 RX ORDER — VALACYCLOVIR 500 MG/1
1 TABLET, FILM COATED ORAL
Qty: 0 | Refills: 0 | DISCHARGE

## 2022-04-07 RX ORDER — SODIUM CHLORIDE 9 MG/ML
1000 INJECTION, SOLUTION INTRAVENOUS
Refills: 0 | Status: DISCONTINUED | OUTPATIENT
Start: 2022-04-07 | End: 2022-04-08

## 2022-04-07 RX ORDER — HYDROMORPHONE HYDROCHLORIDE 2 MG/ML
0.5 INJECTION INTRAMUSCULAR; INTRAVENOUS; SUBCUTANEOUS
Refills: 0 | Status: DISCONTINUED | OUTPATIENT
Start: 2022-04-07 | End: 2022-04-07

## 2022-04-07 RX ADMIN — Medication 1 DROP(S): at 23:59

## 2022-04-07 RX ADMIN — Medication 1000 MILLIGRAM(S): at 21:12

## 2022-04-07 RX ADMIN — PANTOPRAZOLE SODIUM 40 MILLIGRAM(S): 20 TABLET, DELAYED RELEASE ORAL at 20:44

## 2022-04-07 RX ADMIN — Medication 1 APPLICATION(S): at 23:59

## 2022-04-07 RX ADMIN — SODIUM CHLORIDE 100 MILLILITER(S): 9 INJECTION, SOLUTION INTRAVENOUS at 15:48

## 2022-04-07 RX ADMIN — APREPITANT 40 MILLIGRAM(S): 80 CAPSULE ORAL at 08:50

## 2022-04-07 RX ADMIN — SODIUM CHLORIDE 100 MILLILITER(S): 9 INJECTION, SOLUTION INTRAVENOUS at 20:41

## 2022-04-07 RX ADMIN — Medication 400 MILLIGRAM(S): at 20:42

## 2022-04-07 NOTE — CONSULT NOTE ADULT - ASSESSMENT
Assessment and Recommendations:  77y female w/ pmhx/ochx of HTN and ROBIN 2/2 Endometriosis, POD1 s/p Laparoscopic repair, paraesophageal hernia, consulted for new L lower lid swelling extending to upper cheek immediately post-op, found to have mild L lower lid crepitus, likely due to intubation and ventilation during surgery, as well as R corneal abrasion.     1. Mild L lower lid crepitus  - Visual acuity (sc): 20/25 OD, 20/20 OS, Pupils: PERRL OU, no APD, Ttono: 16 OU, Extraocular movements (EOMs): Full OU, no pain, no diplopia, Confrontational Visual Field (CVF): Full OU, Color Plates: 12/12 OU  - Anterior exam notable for soft tissue crepitus of L lower lid extending to L upper cheek  - Recommend cold packs to lid swelling to help dissipate subcutaneous air  - Will self-resolve    2. R corneal abrasion  - 4mm, linear, inferiorly  - Start oflox QID and erythromycin ointment QHS to R eye    Outpatient follow-up: Patient should follow-up with his/her ophthalmologist or with [facility] Department of Ophthalmology at the address below     [address] [place] [address]. [address]  [place] [address]  [phone]    Case discussed with and clinical images reviewed with Dr. Kellogg, chief resident.     Meenu SALINAS Rai, MD  PGY-2, Ophthalmology  [phone]    Also available on [employer] Teams. Assessment and Recommendations:  77y female w/ pmhx/ochx of HTN and ROBIN 2/2 Endometriosis, POD1 s/p Laparoscopic repair, paraesophageal hernia, consulted for new L lower lid swelling extending to upper cheek immediately post-op, found to have mild L lower lid crepitus, likely due to intubation and ventilation during surgery, as well as R corneal abrasion.     1. Mild L lower lid crepitus  - Visual acuity (sc): 20/25 OD, 20/20 OS, Pupils: PERRL OU, no APD, Ttono: 11 OD, 10 OS, Extraocular movements (EOMs): Full OU, no pain, no diplopia, Confrontational Visual Field (CVF): Full OU, Color Plates: 12/12 OU  - Anterior exam notable for soft tissue crepitus of L lower lid extending to L upper cheek  - Recommend cold packs to lid swelling to help dissipate subcutaneous air  - Will self-resolve    2. R corneal abrasion  - 4mm, linear, inferiorly  - Start oflox QID and erythromycin ointment QHS to R eye    Outpatient follow-up: Patient should follow-up with his/her ophthalmologist or with [facility] Department of Ophthalmology at the address below     [address] [place] [address]. [address]  [place] [address]  [phone]    Case discussed with and clinical images reviewed with Dr. Kellogg, chief resident.     Meenu SALINAS Rai, MD  PGY-2, Ophthalmology  [phone]    Also available on [employer] Teams. Assessment and Recommendations:  77y female w/ pmhx/ochx of HTN and ROBIN 2/2 Endometriosis, POD1 s/p Laparoscopic repair, paraesophageal hernia, consulted for new L lower lid swelling extending to upper cheek immediately post-op, found to have mild L lower lid crepitus, likely due to intubation and ventilation during surgery, as well as R corneal abrasion.     1. Mild L lower lid crepitus  - Visual acuity (sc): 20/25 OD, 20/20 OS, Pupils: PERRL OU, no APD, Ttono: 11 OD, 10 OS, Extraocular movements (EOMs): Full OU, no pain, no diplopia, Confrontational Visual Field (CVF): Full OU, Color Plates: 12/12 OU  - Anterior exam notable for soft tissue crepitus of L lower lid extending to L upper cheek  - Recommend cold packs to lid swelling to help dissipate subcutaneous air  - Will self-resolve    2. R corneal abrasion  - 4mm, linear, inferiorly  - Start oflox QID and erythromycin ointment QHS to R eye    Outpatient follow-up: Patient should follow-up with his/her ophthalmologist or with [facility] Department of Ophthalmology within 1 week at the address below     [address] [place] [address]. [address]  [place] [address]  [phone]    Case discussed with and clinical images reviewed with Dr. Kellogg, chief resident.     Meenu SALINAS Rai, MD  PGY-2, Ophthalmology  [phone]    Also available on [employer] Teams.

## 2022-04-07 NOTE — PACU DISCHARGE NOTE - MENTAL STATUS: PATIENT PARTICIPATION
Patient brought in from nursing home after reaching for door knob, hand slipped,   And he fell backwords.   Pt complaining of small amount of back pain Awake

## 2022-04-07 NOTE — CHART NOTE - NSCHARTNOTEFT_GEN_A_CORE
Patient is a 77y old  Female who presents with a chief complaint of "I am having a hernia repair." (18 Mar 2022 16:34)      STATUS POST:    POST OPERATIVE DAY #0     SUBJECTIVE:    Vital Signs Last 24 Hrs  T(C): 36.8 (07 Apr 2022 18:22), Max: 37.3 (07 Apr 2022 16:15)  T(F): 98.2 (07 Apr 2022 18:22), Max: 99.1 (07 Apr 2022 16:15)  HR: 89 (07 Apr 2022 18:22) (76 - 89)  BP: 157/72 (07 Apr 2022 18:22) (137/63 - 169/74)  BP(mean): 95 (07 Apr 2022 17:15) (86 - 112)  RR: 18 (07 Apr 2022 18:22) (16 - 18)  SpO2: 95% (07 Apr 2022 18:22) (93% - 100%)      PHYSICAL EXAM   General: appears well, NAD  Head: tear trough swelling on L eye  Neck: supple  Chest: equal expansion bilaterally, non-labored breathing  CV: RRR  Abdomen: soft, appropriate incisional tenderness, dressings clean/dry/intact      I&O's Summary    07 Apr 2022 07:01  -  07 Apr 2022 19:09  --------------------------------------------------------  IN: 100 mL / OUT: 150 mL / NET: -50 mL      I&O's Detail    07 Apr 2022 07:01  -  07 Apr 2022 19:09  --------------------------------------------------------  IN:    dextrose 5% + sodium chloride 0.45% w/ Additives: 100 mL  Total IN: 100 mL    OUT:    Voided (mL): 150 mL  Total OUT: 150 mL    Total NET: -50 mL          ASSESSMENT  77y female with PMHx of HTN, ROBIN (2/2 endometriosis) w/reports of indigestion for >25 yrs with retrosternal discomfort and exacerbation of symptoms this year. Endoscopy from 10/13/21 demonstrates a j-shaped stomach and pylorus at 40 cm from the incisors, consistent with an intra-thoracic stomach. An incidental asymptomatic LLQ interstitial hernia was also noted. Pt now s/p scheduled Laparoscopic Paraesophageal hernia repair with Dr. Donahue on 4/7.      PLAN  - ophtho consult for L eye swelling  - pain control PRN  - DVT ppx: lovenox  - OOBAT  - f/u AM labs  - CLD      GREEN  [id number] Patient is a 77y old  Female who presents with a chief complaint of "I am having a hernia repair." (18 Mar 2022 16:34)      STATUS POST:    POST OPERATIVE DAY #0     SUBJECTIVE:    Vital Signs Last 24 Hrs  T(C): 36.8 (07 Apr 2022 18:22), Max: 37.3 (07 Apr 2022 16:15)  T(F): 98.2 (07 Apr 2022 18:22), Max: 99.1 (07 Apr 2022 16:15)  HR: 89 (07 Apr 2022 18:22) (76 - 89)  BP: 157/72 (07 Apr 2022 18:22) (137/63 - 169/74)  BP(mean): 95 (07 Apr 2022 17:15) (86 - 112)  RR: 18 (07 Apr 2022 18:22) (16 - 18)  SpO2: 95% (07 Apr 2022 18:22) (93% - 100%)      PHYSICAL EXAM   General: appears well, NAD  Head: tear trough swelling on L eye  Neck: supple  Chest: equal expansion bilaterally, non-labored breathing  CV: RRR  Abdomen: soft, appropriate incisional tenderness, dressings clean/dry/intact      I&O's Summary    07 Apr 2022 07:01  -  07 Apr 2022 19:09  --------------------------------------------------------  IN: 100 mL / OUT: 150 mL / NET: -50 mL      I&O's Detail    07 Apr 2022 07:01  -  07 Apr 2022 19:09  --------------------------------------------------------  IN:    dextrose 5% + sodium chloride 0.45% w/ Additives: 100 mL  Total IN: 100 mL    OUT:    Voided (mL): 150 mL  Total OUT: 150 mL    Total NET: -50 mL          ASSESSMENT  77y female with PMHx of HTN, ROBIN (2/2 endometriosis) w/reports of indigestion for >25 yrs with retrosternal discomfort and exacerbation of symptoms this year. Endoscopy from 10/13/21 demonstrates a j-shaped stomach and pylorus at 40 cm from the incisors, consistent with an intra-thoracic stomach. An incidental asymptomatic LLQ interstitial hernia was also noted. Pt now s/p scheduled Laparoscopic Paraesophageal hernia repair with Dr. Donahue on 4/7.      PLAN  - ophtho consult for L eye swelling  - pain control PRN  - DVT ppx: lovenox  - OOBAT  - f/u AM labs  - CLD  [facility]  [id number]

## 2022-04-07 NOTE — CONSULT NOTE ADULT - SUBJECTIVE AND OBJECTIVE BOX
[facility] DEPARTMENT OF OPHTHALMOLOGY - INITIAL ADULT CONSULT  ------------------------------------------------------------------------------------------------------------    HPI:  77 year old female with PMH HTN and ROBIN 2/2 Endometriosis reports c/o indigestion for over 25 years with retrosternal discomfort. Over the past year, she has been having exacerbations of these symptoms. She has excessive burping and retrosternal fullness after meals. She denies any history of choking or nocturnal regurgitation. Endoscopy from 10/13/21 demonstrates a j-shaped stomach and pylorus at 40 cm from the incisors, consistent with an intra-thoracic stomach. An incidental asymptomatic LLQ interstitial hernia was also noted. Pt now presents to [facility] for scheduled Laparoscopic Paraesophageal hernia repair with Dr. Donahue on 3/29/22.    Interval History: S/p Laparoscopic repair, paraesophageal hernia, earlier today. Noted to have L lower lid swelling immediately post-op, was not present pre-op. Pt denies eye pain or any visual changes.     PAST MEDICAL & SURGICAL HISTORY:  HTN (hypertension)    Diaphragmatic hernia without obstruction or gangrene    Hiatal hernia    Endometriosis  s/p ROBIN in 1984    HZ (herpes zoster)  outbreak on right buttock that has crusted over - pt currently taking Valtrex    Dyspepsia  with retrosternal discomfort for over 25 years    Lipoma  on neck s/p excision under anesthesia around 1990 - benign    Blood clot in vein  Superficial blood clot in RLE - No treatment with AC - s/p Duplex B/L LE - Managed by PCP - 5 years ago    S/P ROBIN (total abdominal hysterectomy)  1984 2/2 Endometriosis    S/P excision of lipoma  on neck s/p excision under anesthesia around 1990 - benign      Past Ocular History: None  Ophthalmic Medications: None  FAMILY HISTORY:    Social History: None    MEDICATIONS  (STANDING):  ceFAZolin   IVPB 2000 milliGRAM(s) IV Intermittent once  chlorhexidine 2% Cloths 1 Application(s) Topical once  dextrose 5% + sodium chloride 0.45% 1000 milliLiter(s) (100 mL/Hr) IV Continuous <Continuous>  pantoprazole  Injectable 40 milliGRAM(s) IV Push daily    MEDICATIONS  (PRN):  HYDROmorphone  Injectable 0.5 milliGRAM(s) IV Push every 4 hours PRN Moderate Pain (4 - 6)  HYDROmorphone  Injectable 1 milliGRAM(s) IV Push every 4 hours PRN Severe Pain (7 - 10)    Allergies & Intolerances:     Review of Systems:  Constitutional: No fever, chills  Eyes: No blurry vision, flashes, floaters, FBS, erythema, discharge, double vision, OU  Neuro: No tremors  Cardiovascular: No chest pain, palpitations  Respiratory: No SOB, no cough  GI: No nausea, vomiting, abdominal pain  : No dysuria  Skin: no rash  Psych: no depression  Endocrine: no polyuria, polydipsia  Heme/lymph: no swelling    VITALS: T(C): 36.9 (04-07-22 @ 20:24)  T(F): 98.5 (04-07-22 @ 20:24), Max: 99.1 (04-07-22 @ 16:15)  HR: 84 (04-07-22 @ 20:24) (76 - 89)  BP: 153/70 (04-07-22 @ 20:24) (137/63 - 169/74)  RR:  (16 - 18)  SpO2:  (93% - 100%)  Wt(kg): --  General: AAO x 3, appropriate mood and affect    Ophthalmology Exam:  Visual acuity (sc): 20/25 OD, 20/20 OS  Pupils: PERRL OU, no APD  Ttono: 16 OU  Extraocular movements (EOMs): Full OU, no pain, no diplopia  Confrontational Visual Field (CVF): Full OU  Color Plates: 12/12 OU    Pen Light Exam (PLE)  External: Flat OD, L lower lid crepitus extending to upper cheek  Lids/Lashes/Lacrimal Ducts: Flat OD, L lower lid crepitus extending to upper cheek  Sclera/Conjunctiva: W+Q OU  Cornea: 4mm inferior linear corneal abrasion OD, Cl OS  Anterior Chamber: D+F OU    Iris: Flat OU  Lens: Tr NS OU    Fundus Exam: dilated with 1% tropicamide and 2.5% phenylephrine  Approval obtained from primary team for dilation  Patient aware that pupils can remained dilated for at least 4-6 hours  Exam performed with 20D lens    Vitreous: wnl OU  Disc, cup/disc: sharp and pink, 0.3 OU  Macula: wnl OU  Vessels: wnl OU  Periphery: small flat nevus along superior arcade OD, scattered drusen OU     [facility] DEPARTMENT OF OPHTHALMOLOGY - INITIAL ADULT CONSULT  ------------------------------------------------------------------------------------------------------------    HPI:  77 year old female with PMH HTN and ROBIN 2/2 Endometriosis reports c/o indigestion for over 25 years with retrosternal discomfort. Over the past year, she has been having exacerbations of these symptoms. She has excessive burping and retrosternal fullness after meals. She denies any history of choking or nocturnal regurgitation. Endoscopy from 10/13/21 demonstrates a j-shaped stomach and pylorus at 40 cm from the incisors, consistent with an intra-thoracic stomach. An incidental asymptomatic LLQ interstitial hernia was also noted. Pt now presents to [facility] for scheduled Laparoscopic Paraesophageal hernia repair with Dr. Donahue on 3/29/22.    Interval History: S/p Laparoscopic repair, paraesophageal hernia, earlier today. Noted to have L lower lid swelling immediately post-op, was not present pre-op. Pt denies eye pain or any visual changes.     PAST MEDICAL & SURGICAL HISTORY:  HTN (hypertension)    Diaphragmatic hernia without obstruction or gangrene    Hiatal hernia    Endometriosis  s/p ROBIN in 1984    HZ (herpes zoster)  outbreak on right buttock that has crusted over - pt currently taking Valtrex    Dyspepsia  with retrosternal discomfort for over 25 years    Lipoma  on neck s/p excision under anesthesia around 1990 - benign    Blood clot in vein  Superficial blood clot in RLE - No treatment with AC - s/p Duplex B/L LE - Managed by PCP - 5 years ago    S/P ROBIN (total abdominal hysterectomy)  1984 2/2 Endometriosis    S/P excision of lipoma  on neck s/p excision under anesthesia around 1990 - benign      Past Ocular History: None  Ophthalmic Medications: None  FAMILY HISTORY:    Social History: None    MEDICATIONS  (STANDING):  ceFAZolin   IVPB 2000 milliGRAM(s) IV Intermittent once  chlorhexidine 2% Cloths 1 Application(s) Topical once  dextrose 5% + sodium chloride 0.45% 1000 milliLiter(s) (100 mL/Hr) IV Continuous <Continuous>  pantoprazole  Injectable 40 milliGRAM(s) IV Push daily    MEDICATIONS  (PRN):  HYDROmorphone  Injectable 0.5 milliGRAM(s) IV Push every 4 hours PRN Moderate Pain (4 - 6)  HYDROmorphone  Injectable 1 milliGRAM(s) IV Push every 4 hours PRN Severe Pain (7 - 10)    Allergies & Intolerances:     Review of Systems:  Constitutional: No fever, chills  Eyes: No blurry vision, flashes, floaters, FBS, erythema, discharge, double vision, OU  Neuro: No tremors  Cardiovascular: No chest pain, palpitations  Respiratory: No SOB, no cough  GI: No nausea, vomiting, abdominal pain  : No dysuria  Skin: no rash  Psych: no depression  Endocrine: no polyuria, polydipsia  Heme/lymph: no swelling    VITALS: T(C): 36.9 (04-07-22 @ 20:24)  T(F): 98.5 (04-07-22 @ 20:24), Max: 99.1 (04-07-22 @ 16:15)  HR: 84 (04-07-22 @ 20:24) (76 - 89)  BP: 153/70 (04-07-22 @ 20:24) (137/63 - 169/74)  RR:  (16 - 18)  SpO2:  (93% - 100%)  Wt(kg): --  General: AAO x 3, appropriate mood and affect    Ophthalmology Exam:  Visual acuity (sc): 20/25 OD, 20/20 OS  Pupils: PERRL OU, no APD  Ttono: 11 OD, 10 OS  Extraocular movements (EOMs): Full OU, no pain, no diplopia  Confrontational Visual Field (CVF): Full OU  Color Plates: 12/12 OU    Pen Light Exam (PLE)  External: Flat OD, L lower lid crepitus extending to upper cheek  Lids/Lashes/Lacrimal Ducts: Flat OD, L lower lid crepitus extending to upper cheek  Sclera/Conjunctiva: W+Q OU  Cornea: 4mm inferior linear corneal abrasion OD, Cl OS  Anterior Chamber: D+F OU    Iris: Flat OU  Lens: Tr NS OU    Fundus Exam: dilated with 1% tropicamide and 2.5% phenylephrine  Approval obtained from primary team for dilation  Patient aware that pupils can remained dilated for at least 4-6 hours  Exam performed with 20D lens    Vitreous: wnl OU  Disc, cup/disc: sharp and pink, 0.3 OU  Macula: wnl OU  Vessels: wnl OU  Periphery: small flat nevus along superior arcade OD, scattered drusen OU

## 2022-04-07 NOTE — PATIENT PROFILE ADULT - FALL HARM RISK - UNIVERSAL INTERVENTIONS
Bed in lowest position, wheels locked, appropriate side rails in place/Call bell, personal items and telephone in reach/Instruct patient to call for assistance before getting out of bed or chair/Non-slip footwear when patient is out of bed/[place] to call system/Physically safe environment - no spills, clutter or unnecessary equipment/Purposeful Proactive Rounding/Room/bathroom lighting operational, light cord in reach

## 2022-04-08 ENCOUNTER — TRANSCRIPTION ENCOUNTER (OUTPATIENT)
Age: 77
End: 2022-04-08

## 2022-04-08 VITALS
SYSTOLIC BLOOD PRESSURE: 119 MMHG | HEART RATE: 82 BPM | OXYGEN SATURATION: 92 % | DIASTOLIC BLOOD PRESSURE: 70 MMHG | TEMPERATURE: 98 F | RESPIRATION RATE: 18 BRPM

## 2022-04-08 LAB
ALBUMIN SERPL ELPH-MCNC: 4.1 G/DL — SIGNIFICANT CHANGE UP (ref 3.3–5)
ALP SERPL-CCNC: 59 U/L — SIGNIFICANT CHANGE UP (ref 40–120)
ALT FLD-CCNC: 118 U/L — HIGH (ref 10–45)
ANION GAP SERPL CALC-SCNC: 11 MMOL/L — SIGNIFICANT CHANGE UP (ref 5–17)
AST SERPL-CCNC: 187 U/L — HIGH (ref 10–40)
BASOPHILS # BLD AUTO: 0.03 K/UL — SIGNIFICANT CHANGE UP (ref 0–0.2)
BASOPHILS NFR BLD AUTO: 0.4 % — SIGNIFICANT CHANGE UP (ref 0–2)
BILIRUB SERPL-MCNC: 0.6 MG/DL — SIGNIFICANT CHANGE UP (ref 0.2–1.2)
BUN SERPL-MCNC: 12 MG/DL — SIGNIFICANT CHANGE UP (ref 7–23)
CALCIUM SERPL-MCNC: 9.2 MG/DL — SIGNIFICANT CHANGE UP (ref 8.4–10.5)
CHLORIDE SERPL-SCNC: 100 MMOL/L — SIGNIFICANT CHANGE UP (ref 96–108)
CO2 SERPL-SCNC: 25 MMOL/L — SIGNIFICANT CHANGE UP (ref 22–31)
CREAT SERPL-MCNC: 0.83 MG/DL — SIGNIFICANT CHANGE UP (ref 0.5–1.3)
EGFR: 73 ML/MIN/1.73M2 — SIGNIFICANT CHANGE UP
EOSINOPHIL # BLD AUTO: 0.01 K/UL — SIGNIFICANT CHANGE UP (ref 0–0.5)
EOSINOPHIL NFR BLD AUTO: 0.1 % — SIGNIFICANT CHANGE UP (ref 0–6)
GLUCOSE SERPL-MCNC: 109 MG/DL — HIGH (ref 70–99)
HCT VFR BLD CALC: 40.3 % — SIGNIFICANT CHANGE UP (ref 34.5–45)
HGB BLD-MCNC: 13.1 G/DL — SIGNIFICANT CHANGE UP (ref 11.5–15.5)
IMM GRANULOCYTES NFR BLD AUTO: 0.5 % — SIGNIFICANT CHANGE UP (ref 0–1.5)
LYMPHOCYTES # BLD AUTO: 1.44 K/UL — SIGNIFICANT CHANGE UP (ref 1–3.3)
LYMPHOCYTES # BLD AUTO: 17.8 % — SIGNIFICANT CHANGE UP (ref 13–44)
MAGNESIUM SERPL-MCNC: 1.7 MG/DL — SIGNIFICANT CHANGE UP (ref 1.6–2.6)
MCHC RBC-ENTMCNC: 31.1 PG — SIGNIFICANT CHANGE UP (ref 27–34)
MCHC RBC-ENTMCNC: 32.5 GM/DL — SIGNIFICANT CHANGE UP (ref 32–36)
MCV RBC AUTO: 95.7 FL — SIGNIFICANT CHANGE UP (ref 80–100)
MONOCYTES # BLD AUTO: 1.03 K/UL — HIGH (ref 0–0.9)
MONOCYTES NFR BLD AUTO: 12.8 % — SIGNIFICANT CHANGE UP (ref 2–14)
NEUTROPHILS # BLD AUTO: 5.52 K/UL — SIGNIFICANT CHANGE UP (ref 1.8–7.4)
NEUTROPHILS NFR BLD AUTO: 68.4 % — SIGNIFICANT CHANGE UP (ref 43–77)
NRBC # BLD: 0 /100 WBCS — SIGNIFICANT CHANGE UP (ref 0–0)
PHOSPHATE SERPL-MCNC: 2.4 MG/DL — LOW (ref 2.5–4.5)
PLATELET # BLD AUTO: 235 K/UL — SIGNIFICANT CHANGE UP (ref 150–400)
POTASSIUM SERPL-MCNC: 3.9 MMOL/L — SIGNIFICANT CHANGE UP (ref 3.5–5.3)
POTASSIUM SERPL-SCNC: 3.9 MMOL/L — SIGNIFICANT CHANGE UP (ref 3.5–5.3)
PROT SERPL-MCNC: 6.5 G/DL — SIGNIFICANT CHANGE UP (ref 6–8.3)
RBC # BLD: 4.21 M/UL — SIGNIFICANT CHANGE UP (ref 3.8–5.2)
RBC # FLD: 13.2 % — SIGNIFICANT CHANGE UP (ref 10.3–14.5)
SODIUM SERPL-SCNC: 136 MMOL/L — SIGNIFICANT CHANGE UP (ref 135–145)
WBC # BLD: 8.07 K/UL — SIGNIFICANT CHANGE UP (ref 3.8–10.5)
WBC # FLD AUTO: 8.07 K/UL — SIGNIFICANT CHANGE UP (ref 3.8–10.5)

## 2022-04-08 PROCEDURE — C9399: CPT

## 2022-04-08 PROCEDURE — 71045 X-RAY EXAM CHEST 1 VIEW: CPT

## 2022-04-08 PROCEDURE — 97161 PT EVAL LOW COMPLEX 20 MIN: CPT

## 2022-04-08 PROCEDURE — 74240 X-RAY XM UPR GI TRC 1CNTRST: CPT | Mod: 26

## 2022-04-08 PROCEDURE — 80053 COMPREHEN METABOLIC PANEL: CPT

## 2022-04-08 PROCEDURE — 83735 ASSAY OF MAGNESIUM: CPT

## 2022-04-08 PROCEDURE — 43281 LAP PARAESOPHAG HERN REPAIR: CPT

## 2022-04-08 PROCEDURE — 88302 TISSUE EXAM BY PATHOLOGIST: CPT

## 2022-04-08 PROCEDURE — 84100 ASSAY OF PHOSPHORUS: CPT

## 2022-04-08 PROCEDURE — 74240 X-RAY XM UPR GI TRC 1CNTRST: CPT

## 2022-04-08 PROCEDURE — C1889: CPT

## 2022-04-08 PROCEDURE — 85025 COMPLETE CBC W/AUTO DIFF WBC: CPT

## 2022-04-08 RX ORDER — ERYTHROMYCIN BASE 5 MG/GRAM
1 OINTMENT (GRAM) OPHTHALMIC (EYE)
Qty: 1 | Refills: 0
Start: 2022-04-08 | End: 2022-04-14

## 2022-04-08 RX ORDER — MAGNESIUM SULFATE 500 MG/ML
2 VIAL (ML) INJECTION ONCE
Refills: 0 | Status: COMPLETED | OUTPATIENT
Start: 2022-04-08 | End: 2022-04-08

## 2022-04-08 RX ORDER — OFLOXACIN 0.3 %
1 DROPS OPHTHALMIC (EYE)
Qty: 1 | Refills: 0
Start: 2022-04-08 | End: 2022-04-14

## 2022-04-08 RX ORDER — SODIUM,POTASSIUM PHOSPHATES 278-250MG
1 POWDER IN PACKET (EA) ORAL ONCE
Refills: 0 | Status: COMPLETED | OUTPATIENT
Start: 2022-04-08 | End: 2022-04-08

## 2022-04-08 RX ADMIN — SODIUM CHLORIDE 100 MILLILITER(S): 9 INJECTION, SOLUTION INTRAVENOUS at 05:05

## 2022-04-08 RX ADMIN — Medication 1 DROP(S): at 01:05

## 2022-04-08 RX ADMIN — Medication 400 MILLIGRAM(S): at 01:05

## 2022-04-08 RX ADMIN — Medication 1 PACKET(S): at 12:20

## 2022-04-08 RX ADMIN — Medication 25 GRAM(S): at 12:19

## 2022-04-08 RX ADMIN — Medication 1 DROP(S): at 12:21

## 2022-04-08 RX ADMIN — SODIUM CHLORIDE 100 MILLILITER(S): 9 INJECTION, SOLUTION INTRAVENOUS at 12:16

## 2022-04-08 RX ADMIN — Medication 1000 MILLIGRAM(S): at 01:35

## 2022-04-08 RX ADMIN — PANTOPRAZOLE SODIUM 40 MILLIGRAM(S): 20 TABLET, DELAYED RELEASE ORAL at 12:17

## 2022-04-08 RX ADMIN — Medication 400 MILLIGRAM(S): at 07:41

## 2022-04-08 RX ADMIN — ENOXAPARIN SODIUM 40 MILLIGRAM(S): 100 INJECTION SUBCUTANEOUS at 00:00

## 2022-04-08 RX ADMIN — Medication 1 DROP(S): at 05:05

## 2022-04-08 NOTE — PHYSICAL THERAPY INITIAL EVALUATION ADULT - ADDITIONAL COMMENTS
Pt resides in private home with dtr, flight within with handrail. PTA independent with mobility and ADL's, pt owns no DME, (+)driving

## 2022-04-08 NOTE — DISCHARGE NOTE PROVIDER - HOSPITAL COURSE
77y female with PMHx of HTN, ROBIN (2/2 endometriosis) w/reports of indigestion for >25 yrs with retrosternal discomfort and exacerbation of symptoms this year. Endoscopy from 10/13/21 demonstrates a j-shaped stomach and pylorus at 40 cm from the incisors, consistent with an intra-thoracic stomach. An incidental asymptomatic LLQ interstitial hernia was also noted. Pt now s/p scheduled Laparoscopic Paraesophageal hernia repair with Dr. Donahue on 4/7.  Post operatively the patient was found to have mild left lower eye lid crepitus and a right corneal abrasion. The patient was seen by opthalmology and recommended Oflox QID and erythromycin ointment QHS to right eye. Swelling will self resolve.   POD 1 the patient went for an upper GI series which demonstrated          77y female with PMHx of HTN, ROBIN (2/2 endometriosis) w/reports of indigestion for >25 yrs with retrosternal discomfort and exacerbation of symptoms this year. Endoscopy from 10/13/21 demonstrates a j-shaped stomach and pylorus at 40 cm from the incisors, consistent with an intra-thoracic stomach. An incidental asymptomatic LLQ interstitial hernia was also noted. Pt now s/p scheduled Laparoscopic Paraesophageal hernia repair with Dr. Donahue on 4/7.  Post operatively the patient was found to have mild left lower eye lid crepitus and a right corneal abrasion. The patient was seen by opthalmology and recommended Oflox QID and erythromycin ointment QHS to right eye. Swelling will self resolve.   POD 1 the patient went for an upper GI series which demonstrated slow flow through distal esophagus suggestive of postoperative edema. No evidence of extraluminal contrast to suggest a leak. At this time, patient has pain well controlled, ambulating, voiding appropriately, tolerating diet. Patient is hemodynamically stable and suitable for discharge with outpatient follow up.

## 2022-04-08 NOTE — PHYSICAL THERAPY INITIAL EVALUATION ADULT - PRECAUTIONS/LIMITATIONS, REHAB EVAL
**Of note, patient was originally scheduled for this procedure on 11/17/2021 however, the patient fell up the stairs and went to [facility] with left shoulder and left wrist fractures. Procedure was postponed.** **Of note, patient was originally scheduled for this procedure on 11/17/2021 however, the patient fell up the stairs and went to [facility] with left shoulder and left wrist fractures. Procedure was postponed.**/no known precautions/limitations

## 2022-04-08 NOTE — PROGRESS NOTE ADULT - ASSESSMENT
77y female with PMHx of HTN, ROBIN (2/2 endometriosis) w/reports of indigestion for >25 yrs with retrosternal discomfort and exacerbation of symptoms this year. Endoscopy from 10/13/21 demonstrates a j-shaped stomach and pylorus at 40 cm from the incisors, consistent with an intra-thoracic stomach. An incidental asymptomatic LLQ interstitial hernia was also noted. Pt now s/p scheduled Laparoscopic Paraesophageal hernia repair with Dr. Donahue on 4/7.      PLAN  - appreciate ophtho recs  - pain control PRN  - DVT ppx: lovenox  - OOBAT  - f/u AM labs  - CLD      GREEN  [id number] 77y female with PMHx of HTN, ROBIN (2/2 endometriosis) w/reports of indigestion for >25 yrs with retrosternal discomfort and exacerbation of symptoms this year. Endoscopy from 10/13/21 demonstrates a j-shaped stomach and pylorus at 40 cm from the incisors, consistent with an intra-thoracic stomach. An incidental asymptomatic LLQ interstitial hernia was also noted. Pt now s/p scheduled Laparoscopic Paraesophageal hernia repair with Dr. Donahue on 4/7.      PLAN  - UGIS today for bleching  - appreciate ophtho recs  - pain control PRN  - DVT ppx: lovenox  - OOBAT  - f/u AM labs  - CLD  - D/c home if UGIS WNL      GREEN  [id number]

## 2022-04-08 NOTE — DISCHARGE NOTE NURSING/CASE MANAGEMENT/SOCIAL WORK - PATIENT PORTAL LINK FT
You can access the FollowMyHealth Patient Portal offered by [facility] by registering at the following website: [email or web address]. By joining [facility]’s FollowMyHealth portal, you will also be able to view your health information using other applications (apps) compatible with our system.

## 2022-04-08 NOTE — DISCHARGE NOTE PROVIDER - CARE PROVIDER_API CALL
Jose Donahue)  Surgery  [address], [address]  [place] [address]  Phone: [phone]  Fax: [phone]  Follow Up Time: 1 week

## 2022-04-08 NOTE — PROGRESS NOTE ADULT - SUBJECTIVE AND OBJECTIVE BOX
SURGERY PROGRESS NOTE  POD #1 Laparoscopic repair, paraesophageal hernia      SUBJECTIVE  Pt seen and examined at bedside. No complaints. Pain controlled. Eye swelling improving.  No acute events overnight.           OBJECTIVE:    PHYSICAL EXAM   General Appearance: Appears well, NAD  Resp: Patent airway, non-labored breathing  CV: RRR  Abdomen: Soft, Nontender, Nondistended, dressing clean/dry/intact    Vital Signs Last 24 Hrs  T(C): 36.9 (08 Apr 2022 00:53), Max: 37.3 (07 Apr 2022 16:15)  T(F): 98.4 (08 Apr 2022 00:53), Max: 99.1 (07 Apr 2022 16:15)  HR: 76 (08 Apr 2022 00:53) (76 - 89)  BP: 131/63 (08 Apr 2022 00:53) (131/63 - 169/74)  BP(mean): 95 (07 Apr 2022 17:15) (86 - 112)  RR: 18 (08 Apr 2022 00:53) (16 - 18)  SpO2: 95% (08 Apr 2022 00:53) (93% - 100%)    I's & O's    04-07-22 @ 07:01  -  04-08-22 @ 02:19  --------------------------------------------------------  IN:    dextrose 5% + sodium chloride 0.45% w/ Additives: 100 mL    IV PiggyBack: 200 mL    Oral Fluid: 440 mL  Total IN: 740 mL    OUT:    Voided (mL): 450 mL  Total OUT: 450 mL    Total NET: 290 mL        MEDICATIONS:  ·	DVT PROPHYLAXIS: enoxaparin Injectable 40 milliGRAM(s)  ·	GI PROPHYLAXIS: pantoprazole  Injectable 40 milliGRAM(s)  ·	ANTIBIOTICS: ceFAZolin   IVPB 2000 milliGRAM(s)     SURGERY PROGRESS NOTE  POD #1 Laparoscopic repair, paraesophageal hernia    Yesterday, patient was evaluated by Ophthalmology and Anesthesia for a now-confirmed R corneal abrasion.   Overnight no acute events. Pt seen and examined this morning at bedside. Patient states that she continues to have referred L shoulder pain. She continues belching when she drinks liquids. Eye swelling improving. Patient is not yet passing gas below.     PHYSICAL EXAM   General: appears well, NAD  Head: tear trough swelling on L eye  Chest: equal expansion bilaterally, non-labored breathing, interval crepitus much decreased   Abdomen: soft, nondistended, nontender, dressings clean/dry/intact    Vital Signs Last 24 Hrs  T(C): 36.9 (08 Apr 2022 00:53), Max: 37.3 (07 Apr 2022 16:15)  T(F): 98.4 (08 Apr 2022 00:53), Max: 99.1 (07 Apr 2022 16:15)  HR: 76 (08 Apr 2022 00:53) (76 - 89)  BP: 131/63 (08 Apr 2022 00:53) (131/63 - 169/74)  BP(mean): 95 (07 Apr 2022 17:15) (86 - 112)  RR: 18 (08 Apr 2022 00:53) (16 - 18)  SpO2: 95% (08 Apr 2022 00:53) (93% - 100%)    MEDICATIONS:  ·	DVT PROPHYLAXIS: enoxaparin Injectable 40 milliGRAM(s)  ·	GI PROPHYLAXIS: pantoprazole  Injectable 40 milliGRAM(s)  ·	ANTIBIOTICS: ceFAZolin   IVPB 2000 milliGRAM(s)

## 2022-04-08 NOTE — PHYSICAL THERAPY INITIAL EVALUATION ADULT - GENERAL OBSERVATIONS, REHAB EVAL
received semisupine in bed, A&OX4, following commands, pleasant & eager to participate, s/p laparoscopic repair of paraesophageal hernia, +right corneal abraison, +IV.

## 2022-04-08 NOTE — PROGRESS NOTE ADULT - ATTENDING COMMENTS
I saw and examined the patient, and reviewed  the history with the patient and   Agree with note which was also reviewed and edited where appropriate.  D/W patient, RN, residents and Fellow  doing well will check UGI   if ok D/C home

## 2022-04-08 NOTE — DISCHARGE NOTE PROVIDER - NSDCMRMEDTOKEN_GEN_ALL_CORE_FT
Allegra 30 mg oral tablet: orally once a day  candesartan 32 mg oral tablet: 1 tab(s) orally once a day  erythromycin 0.5% ophthalmic ointment: 1 application to right eye once a day (at bedtime)  hydroCHLOROthiazide 12.5 mg oral capsule: 1 cap(s) orally once a day  ofloxacin 0.3% ophthalmic solution: 1 drop(s) to right eye 4 times a day  omeprazole 20 mg oral delayed release capsule: orally 2 times a day  Probiotic Formula oral capsule: 1 cap(s) orally once a day  Vitamin D3 25 mcg (1000 intl units) oral tablet: 1 tab(s) orally once a day

## 2022-04-08 NOTE — DISCHARGE NOTE PROVIDER - NSDCCPTREATMENT_GEN_ALL_CORE_FT
PRINCIPAL PROCEDURE  Procedure: Laparoscopic repair, paraesophageal hernia  Findings and Treatment:

## 2022-04-08 NOTE — DISCHARGE NOTE NURSING/CASE MANAGEMENT/SOCIAL WORK - NSDCPEFALRISK_GEN_ALL_CORE
For information on Fall & Injury Prevention, visit: [email or web address] OR  [email or web address] OR  https://www.cdc.gov/steadi/patient.html

## 2022-04-08 NOTE — DISCHARGE NOTE PROVIDER - NSDCCPCAREPLAN_GEN_ALL_CORE_FT
PRINCIPAL DISCHARGE DIAGNOSIS  Diagnosis: Diaphragmatic hernia without obstruction or gangrene  Assessment and Plan of Treatment: Recovering from surgery  PAIN: You may take Tylenol for pain. Use as directed. The maximum dose of Tylenol for 24 hours is 4000 mg. Please do not exceed that amount.  MEDICATIONS: if you take pills that are larger than the size of an M&M they need to be crushed prior to taking  WOUND CARE: Steri-strips have been applied to your incisions.  Do not remove these.  They will fall off as they get wet; in approximately 7-10 days.  BATHING: Please do not submerge wound underwater. Do not take a bath. You may shower and/or sponge bathe.  ACTIVITY: No heavy lifting or straining; do not lift anything greater than 10 pounds. Otherwise, you may return to your usual level of physical activity. If you are taking narcotic pain medication (such as Percocet), do NOT drive a car, operate machinery or make important decisions.  DIET: pureed diet   NOTIFY YOUR SURGEON IF: You have any bleeding that does not stop, any pus draining from your wound, any fever (over 100.4 F) or chills, persistent nausea/vomiting, persistent diarrhea, or if your pain is not controlled on your discharge pain medications.  FOLLOW-UP:  1. Please call to make a follow-up appointment within one week of discharge with Dr. Donahue.   2. Please follow up with your primary care physician in one week regarding your hospitalization.        SECONDARY DISCHARGE DIAGNOSES  Diagnosis: Corneal abrasion  Assessment and Plan of Treatment: Follow up with the Opthtamology clinic in 1 week. Use the drops and ointment for 1 week.   Call to make an appointment.   [address]  [place] [address] [phone]

## 2022-04-08 NOTE — PHYSICAL THERAPY INITIAL EVALUATION ADULT - PERTINENT HX OF CURRENT PROBLEM, REHAB EVAL
Pt is 77F admitted 4/7/22 PMHX HTN and ROBIN 2/2 Endometriosis reports c/o indigestion for over 25 years with retrosternal discomfort. Endoscopy from 10/13/21 demonstrates a j-shaped stomach and pylorus at 40 cm from the incisors, consistent with an intra-thoracic stomach. An incidental asymptomatic LLQ interstitial hernia was also noted. Pt now presents to [facility] for scheduled Laparoscopic Paraesophageal hernia repair.

## 2022-04-11 PROBLEM — K44.9 DIAPHRAGMATIC HERNIA WITHOUT OBSTRUCTION OR GANGRENE: Chronic | Status: ACTIVE | Noted: 2022-03-18

## 2022-04-11 PROBLEM — B02.9 ZOSTER WITHOUT COMPLICATIONS: Chronic | Status: ACTIVE | Noted: 2022-03-18

## 2022-04-11 PROBLEM — N80.9 ENDOMETRIOSIS, UNSPECIFIED: Chronic | Status: ACTIVE | Noted: 2022-03-18

## 2022-04-11 PROBLEM — I82.90 ACUTE EMBOLISM AND THROMBOSIS OF UNSPECIFIED VEIN: Chronic | Status: ACTIVE | Noted: 2022-03-18

## 2022-04-11 PROBLEM — D17.9 BENIGN LIPOMATOUS NEOPLASM, UNSPECIFIED: Chronic | Status: ACTIVE | Noted: 2022-03-18

## 2022-04-11 PROBLEM — R10.13 EPIGASTRIC PAIN: Chronic | Status: ACTIVE | Noted: 2022-03-18

## 2022-04-14 LAB — SURGICAL PATHOLOGY STUDY: SIGNIFICANT CHANGE UP

## 2022-04-22 ENCOUNTER — APPOINTMENT (OUTPATIENT)
Dept: SURGERY | Facility: CLINIC | Age: 77
End: 2022-04-22
Payer: MEDICARE

## 2022-04-22 VITALS
DIASTOLIC BLOOD PRESSURE: 80 MMHG | HEART RATE: 62 BPM | RESPIRATION RATE: 17 BRPM | SYSTOLIC BLOOD PRESSURE: 150 MMHG | TEMPERATURE: 97.3 F | OXYGEN SATURATION: 96 %

## 2022-04-22 PROCEDURE — 99024 POSTOP FOLLOW-UP VISIT: CPT

## 2022-04-22 RX ORDER — FEXOFENADINE HCL 60 MG
CAPSULE ORAL
Refills: 0 | Status: ACTIVE | COMMUNITY

## 2022-04-22 NOTE — HISTORY OF PRESENT ILLNESS
[de-identified] : Melina is a 78 y/o female here for post-op. S/p Laparoscopic repair of paraesophageal hernia and Nissen fundoplication on 04/07/22.\par Pathology Hernia sac, herniorrhaphy fibroconnective and mature adipose tissue, consistent with hernia sac; one reactive lymph node (0/1).\par \par Today pt reports feeling well. Tolerating puree diet, no nausea, no vomiting. 1 formed BM daily, occasional straining. Denies fever and chills.  She has no dysphagia she is tolerating her puréed diet with no difficulties she denies nocturnal regurgitation or reflux she has no abdominal pain

## 2022-04-22 NOTE — ASSESSMENT
[FreeTextEntry1] : 77-year-old female doing very well after repair of hiatal hernia she is tolerating her diet we have advanced her diet and increased her activity level she will follow-up in 2 months her left inguinal hernia is stable and nontender she will follow-up in 2 months for evaluation for this repair all of her questions were answered

## 2022-04-22 NOTE — PHYSICAL EXAM
[JVD] : no jugular venous distention  [Normal Breath Sounds] : Normal breath sounds [Normal Heart Sounds] : normal heart sounds [Abdominal Masses] : No abdominal masses [No HSM] : no hepatosplenomegaly [No Rash or Lesion] : No rash or lesion [Calm] : calm [de-identified] : Ambulating without difficulty [de-identified] : Within normal limits [de-identified] : Soft nontender nondistended healed surgical scars [de-identified] : Cranial nerves II through XII grossly intact

## 2022-08-29 ENCOUNTER — APPOINTMENT (OUTPATIENT)
Dept: SURGERY | Facility: CLINIC | Age: 77
End: 2022-08-29

## 2023-05-23 NOTE — ED PROVIDER NOTE - NS_EDPROVIDERDISPOUSERTYPE_ED_A_ED
Telephone encounter        [facility] Sleep Medicine    Patient Name: Mackenzie Richards  Age: 73 y.o.   : 1956    Date of Visit: 22        HPI   Mackenzie Richards is a 73 y.o. female with  has a past medical history of GERD (gastroesophageal reflux disease), Hyperlipidemia, Hypertension, Hypothyroidism, Obesity, and Sleep apnea. Called pt to review results of home sleep study recently completed for evaluation of NEEMA. 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 a theatre or a meeting) 0   As a passenger in a car for an hour without a break 0   Lying down to rest in the afternoon when circumstances permit 3   Sitting and talking to someone 0   Sitting quietly after a lunch without alcohol 3   In a car, while stopped for a few minutes in traffic 0   [place] Sleepiness Score 12      Has sleep study 7-8 years ago in [place]    She was started on CPAP at that time however she noticed sinus infections and bronchitis which were worsening  She stopped using the CPAP 2 years ago and the URIs have significantly reduced  She did clean her equipment every other day with soap/water and used distilled water. She noticed pink/slimy residue in her water chamber which she would clean out periodically.     Unclear what the severity of her NEEMA was  She does have very fragmented sleep and is very tired during the day  When she was on CPAP, her sleep was well consolidated, she rarely had arousals Attending Attestation (For Attendings USE Only)...

## 2024-03-22 ENCOUNTER — APPOINTMENT (OUTPATIENT)
Dept: SURGERY | Facility: CLINIC | Age: 79
End: 2024-03-22
Payer: MEDICARE

## 2024-03-22 DIAGNOSIS — K44.9 DIAPHRAGMATIC HERNIA W/OUT OBSTRUCTION OR GANGRENE: ICD-10-CM

## 2024-03-22 DIAGNOSIS — K40.90 UNILATERAL INGUINAL HERNIA, W/OUT OBSTRUCTION OR GANGRENE, NOT SPECIFIED AS RECURRENT: ICD-10-CM

## 2024-03-22 PROCEDURE — 99203 OFFICE O/P NEW LOW 30 MIN: CPT

## 2024-03-23 PROBLEM — K40.90 HERNIA, INGUINAL, LEFT: Status: ACTIVE | Noted: 2024-03-23

## 2024-03-23 PROBLEM — K44.9 PARAESOPHAGEAL HIATAL HERNIA: Status: ACTIVE | Noted: 2021-10-25

## 2024-03-23 NOTE — PHYSICAL EXAM
[Normal Breath Sounds] : Normal breath sounds [Normal Heart Sounds] : normal heart sounds [Normal Rate and Rhythm] : normal rate and rhythm [Abdomen Tenderness] : ~T ~M Abdominal tenderness [Abdominal Masses] : Abdominal mass present [de-identified] : nl [No Rash or Lesion] : No rash or lesion [de-identified] : nl [de-identified] : tender, 6.0 cm. reducible mass in the LLQ. Lower midline incision [de-identified] : nl

## 2024-03-23 NOTE — ASSESSMENT
[FreeTextEntry1] : Discussion regarding all options and risks To under CT scan of abdomen to determine if this is a LIH or ventral, incisional hernia All lab values and imaging studies reviewed Discussed with Medicine

## 2024-03-23 NOTE — HISTORY OF PRESENT ILLNESS
[de-identified] : This 79 year old woman has been aware of an uncomfortable mass in the LLQ for the past several years. When she underwent repair of a hiatal hernia, the "lower hernia was supposed to have been repaired at the same time - it was not. The patient has been eating well and denies any recent change in bowel habits

## 2024-03-25 ENCOUNTER — RESULT REVIEW (OUTPATIENT)
Age: 79
End: 2024-03-25

## 2024-03-25 ENCOUNTER — APPOINTMENT (OUTPATIENT)
Dept: CT IMAGING | Facility: HOSPITAL | Age: 79
End: 2024-03-25
Payer: MEDICARE

## 2024-03-25 ENCOUNTER — OUTPATIENT (OUTPATIENT)
Dept: OUTPATIENT SERVICES | Facility: HOSPITAL | Age: 79
LOS: 1 days | End: 2024-03-25
Payer: MEDICARE

## 2024-03-25 DIAGNOSIS — Z90.710 ACQUIRED ABSENCE OF BOTH CERVIX AND UTERUS: Chronic | ICD-10-CM

## 2024-03-25 DIAGNOSIS — R19.00 INTRA-ABDOMINAL AND PELVIC SWELLING, MASS AND LUMP, UNSPECIFIED SITE: ICD-10-CM

## 2024-03-25 DIAGNOSIS — Z98.890 OTHER SPECIFIED POSTPROCEDURAL STATES: Chronic | ICD-10-CM

## 2024-03-25 PROCEDURE — 74177 CT ABD & PELVIS W/CONTRAST: CPT | Mod: 26,MH

## 2024-03-25 PROCEDURE — 74177 CT ABD & PELVIS W/CONTRAST: CPT

## 2024-03-27 ENCOUNTER — APPOINTMENT (OUTPATIENT)
Dept: SURGERY | Facility: CLINIC | Age: 79
End: 2024-03-27
Payer: MEDICARE

## 2024-03-27 VITALS — BODY MASS INDEX: 26.75 KG/M2 | WEIGHT: 151 LBS | HEIGHT: 63 IN | TEMPERATURE: 97.9 F

## 2024-03-27 DIAGNOSIS — R19.04 LEFT LOWER QUADRANT ABDOMINAL SWELLING, MASS AND LUMP: ICD-10-CM

## 2024-03-27 DIAGNOSIS — Z83.79 FAMILY HISTORY OF OTHER DISEASES OF THE DIGESTIVE SYSTEM: ICD-10-CM

## 2024-03-27 DIAGNOSIS — R10.30 LOWER ABDOMINAL PAIN, UNSPECIFIED: ICD-10-CM

## 2024-03-27 DIAGNOSIS — Z86.018 PERSONAL HISTORY OF OTHER BENIGN NEOPLASM: ICD-10-CM

## 2024-03-27 PROCEDURE — 99214 OFFICE O/P EST MOD 30 MIN: CPT

## 2024-03-27 NOTE — REVIEW OF SYSTEMS
[Heart Rate Is Slow] : the heart rate was not slow [Abdominal Pain] : abdominal pain [Chest Pain] : no chest pain [Negative] : Respiratory

## 2024-03-27 NOTE — ASSESSMENT
[FreeTextEntry1] : All options and risks discussed with patient Scheduled for surgery on 4/18/24 All lab values and imaging studies reviewed Discussed with Medicine

## 2024-03-27 NOTE — HISTORY OF PRESENT ILLNESS
[de-identified] : The patient continues to experience LLQ abdominal pain. She has been eating well and denies any change in bowel habits. The recent CT scan demonstrated an incarcerated Spigelian hernia.

## 2024-03-27 NOTE — PHYSICAL EXAM
[Normal Heart Sounds] : normal heart sounds [Normal Breath Sounds] : Normal breath sounds [Abdominal Masses] : Abdominal mass present [Normal Rate and Rhythm] : normal rate and rhythm [No Rash or Lesion] : No rash or lesion [Abdomen Tenderness] : ~T ~M No abdominal tenderness [de-identified] : nl [de-identified] : l [de-identified] : nl [de-identified] : Nonreducible LLQ mass

## 2024-04-11 ENCOUNTER — OUTPATIENT (OUTPATIENT)
Dept: OUTPATIENT SERVICES | Facility: HOSPITAL | Age: 79
LOS: 1 days | End: 2024-04-11
Payer: MEDICARE

## 2024-04-11 VITALS
DIASTOLIC BLOOD PRESSURE: 82 MMHG | RESPIRATION RATE: 18 BRPM | OXYGEN SATURATION: 95 % | HEART RATE: 72 BPM | WEIGHT: 153.44 LBS | TEMPERATURE: 97 F | HEIGHT: 60.5 IN | SYSTOLIC BLOOD PRESSURE: 150 MMHG

## 2024-04-11 DIAGNOSIS — Z01.818 ENCOUNTER FOR OTHER PREPROCEDURAL EXAMINATION: ICD-10-CM

## 2024-04-11 DIAGNOSIS — K43.6 OTHER AND UNSPECIFIED VENTRAL HERNIA WITH OBSTRUCTION, WITHOUT GANGRENE: ICD-10-CM

## 2024-04-11 DIAGNOSIS — Z90.710 ACQUIRED ABSENCE OF BOTH CERVIX AND UTERUS: Chronic | ICD-10-CM

## 2024-04-11 DIAGNOSIS — Z87.19 PERSONAL HISTORY OF OTHER DISEASES OF THE DIGESTIVE SYSTEM: Chronic | ICD-10-CM

## 2024-04-11 DIAGNOSIS — R10.13 EPIGASTRIC PAIN: ICD-10-CM

## 2024-04-11 DIAGNOSIS — Z98.890 OTHER SPECIFIED POSTPROCEDURAL STATES: Chronic | ICD-10-CM

## 2024-04-11 DIAGNOSIS — I10 ESSENTIAL (PRIMARY) HYPERTENSION: ICD-10-CM

## 2024-04-11 PROCEDURE — 93010 ELECTROCARDIOGRAM REPORT: CPT | Mod: NC

## 2024-04-11 RX ORDER — FEXOFENADINE HCL 30 MG
0 TABLET ORAL
Qty: 0 | Refills: 0 | DISCHARGE

## 2024-04-11 NOTE — H&P PST ADULT - PROBLEM SELECTOR PLAN 2
The Caprini score indicates this patient is at risk for a VTE event (score 3-5).  Most surgical patients in this group would benefit from pharmacologic prophylaxis.  The surgical team will determine the balance between VTE risk and bleeding risk continue medications as prescribed.

## 2024-04-11 NOTE — H&P PST ADULT - COMMENTS
Pt is very nervous 174/88 electronic Reports DVT RLE   Reports partial lower, full dentures upper  Denies family h/o DVT, PE  Denies bleeding or clotting disorders  Denies loose teeth/caps Reports DVT RLE, reports managed by her PCP, never went on ACs or heme   Reports partial lower, full dentures upper  Denies family h/o DVT, PE  Denies bleeding or clotting disorders  Denies loose teeth/caps

## 2024-04-11 NOTE — H&P PST ADULT - NSICDXPASTSURGICALHX_GEN_ALL_CORE_FT
PAST SURGICAL HISTORY:  History of hiatal hernia     S/P excision of lipoma on neck s/p excision under anesthesia around 1990 - benign    S/P ROBIN (total abdominal hysterectomy) 1984 2/2 Endometriosis

## 2024-04-11 NOTE — H&P PST ADULT - HISTORY OF PRESENT ILLNESS
77 year old female with PMH HTN and ROBIN 2/2 Endometriosis reports c/o indigestion for over 25 years with retrosternal discomfort. Over the past year, she has been having exacerbations of these symptoms. She has excessive burping and retrosternal fullness after meals. She denies any history of choking or nocturnal regurgitation. Endoscopy from 10/13/21 demonstrates a j-shaped stomach and pylorus at 40 cm from the incisors, consistent with an intra-thoracic stomach. An incidental asymptomatic LLQ interstitial hernia was also noted. Pt now presents to [facility] for scheduled Laparoscopic Paraesophageal hernia repair with Dr. Donahue on 3/29/22.    **Of note, patient was originally scheduled for this procedure on 11/17/2021 however, the patient fell up the stairs and went to [facility] with left shoulder and left wrist fractures. Procedure was postponed.**    Covid vaccine Moderna 2nd dose received 4/3/21; Moderna Booster received 10/28/21  Covid PCR test scheduled for 3/26/22 at [facility]  Denies Recent travel, Exposure or Covid symptoms Patient is a 79 year old female with PMHx of GERD, HTN, and ROBIN 2/2 Endometriosis presents for perioperative testing for repair of incarcerated spigelian hernia with Dr. Reid Mackey scheduled for 04/18/2024. Reports abdominal discomfort with bloating, nausea, smaller bowel movements, and feeling gassy for the past 2 years, gradually worsening. Denies any acute symptoms at this time. Patient otherwise feels well overall.

## 2024-04-11 NOTE — H&P PST ADULT - NEGATIVE GASTROINTESTINAL SYMPTOMS
no nausea/no vomiting/no diarrhea/no constipation/no change in bowel habits/no abdominal pain/no jaundice no vomiting

## 2024-04-11 NOTE — H&P PST ADULT - PROBLEM SELECTOR PLAN 1
Pt is scheduled for a Laparoscopic Paraesophageal Hernia Repair with Dr. Donahue on 3/29/22  Covid PCR test scheduled for 3/26/22 at [facility]  CBC, BMP and T/S ordered and obtained at [facility]  ABO on admit  Emend ordered pre-operatively DOS for history of PONV  Pending M/E with PCP on 3/23/22  Incentive spirometer instructions provided to pt with teach back demonstration Patient provided with pre-operative instructions and verbalized understanding.  Patient can take omeprazole and candesartan but otherwise will be NPO on day of surgery. Patient will stop NSAIDs, aspirin, herbal supplements or vitamins 1 week prior to surgery.  Chlorhexidine wash provided with instructions, verbalized understanding.        Pending medical clearance as per surgeon.

## 2024-04-11 NOTE — H&P PST ADULT - NSANTHOSAYNRD_GEN_A_CORE
neck 15 inches/No. BRENDA screening performed.  STOP BANG Legend: 0-2 = LOW Risk; 3-4 = INTERMEDIATE Risk; 5-8 = HIGH Risk

## 2024-04-11 NOTE — H&P PST ADULT - PROBLEM SELECTOR PROBLEM 1
Diaphragmatic hernia without obstruction or gangrene Other and unspecified ventral hernia with obstruction, without gangrene

## 2024-04-11 NOTE — H&P PST ADULT - NSICDXPASTMEDICALHX_GEN_ALL_CORE_FT
PAST MEDICAL HISTORY:  Blood clot in vein Superficial blood clot in RLE - No treatment with AC - s/p Duplex B/L LE - Managed by PCP - 5 years ago    Diaphragmatic hernia without obstruction or gangrene     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

## 2024-04-17 ENCOUNTER — TRANSCRIPTION ENCOUNTER (OUTPATIENT)
Age: 79
End: 2024-04-17

## 2024-04-18 ENCOUNTER — APPOINTMENT (OUTPATIENT)
Dept: SURGERY | Facility: HOSPITAL | Age: 79
End: 2024-04-18

## 2024-04-18 ENCOUNTER — RESULT REVIEW (OUTPATIENT)
Age: 79
End: 2024-04-18

## 2024-04-18 ENCOUNTER — OUTPATIENT (OUTPATIENT)
Dept: OUTPATIENT SERVICES | Facility: HOSPITAL | Age: 79
LOS: 1 days | End: 2024-04-18
Payer: MEDICARE

## 2024-04-18 ENCOUNTER — TRANSCRIPTION ENCOUNTER (OUTPATIENT)
Age: 79
End: 2024-04-18

## 2024-04-18 VITALS
DIASTOLIC BLOOD PRESSURE: 74 MMHG | HEIGHT: 60.5 IN | HEART RATE: 76 BPM | RESPIRATION RATE: 15 BRPM | TEMPERATURE: 98 F | WEIGHT: 153.44 LBS | OXYGEN SATURATION: 96 % | SYSTOLIC BLOOD PRESSURE: 184 MMHG

## 2024-04-18 VITALS
DIASTOLIC BLOOD PRESSURE: 79 MMHG | TEMPERATURE: 99 F | OXYGEN SATURATION: 94 % | RESPIRATION RATE: 16 BRPM | HEART RATE: 90 BPM | SYSTOLIC BLOOD PRESSURE: 152 MMHG

## 2024-04-18 DIAGNOSIS — Z98.890 OTHER SPECIFIED POSTPROCEDURAL STATES: Chronic | ICD-10-CM

## 2024-04-18 DIAGNOSIS — K43.6 OTHER AND UNSPECIFIED VENTRAL HERNIA WITH OBSTRUCTION, WITHOUT GANGRENE: ICD-10-CM

## 2024-04-18 DIAGNOSIS — Z90.710 ACQUIRED ABSENCE OF BOTH CERVIX AND UTERUS: Chronic | ICD-10-CM

## 2024-04-18 DIAGNOSIS — Z87.19 PERSONAL HISTORY OF OTHER DISEASES OF THE DIGESTIVE SYSTEM: Chronic | ICD-10-CM

## 2024-04-18 PROCEDURE — 49596 RPR AA HRN 1ST > 10 NCR/STRN: CPT | Mod: 22

## 2024-04-18 PROCEDURE — 88302 TISSUE EXAM BY PATHOLOGIST: CPT | Mod: 26

## 2024-04-18 PROCEDURE — C9399: CPT

## 2024-04-18 PROCEDURE — 49596 RPR AA HRN 1ST > 10 NCR/STRN: CPT

## 2024-04-18 PROCEDURE — 88302 TISSUE EXAM BY PATHOLOGIST: CPT

## 2024-04-18 PROCEDURE — C1781: CPT

## 2024-04-18 DEVICE — MESH HERNIA MARLEX 10 X 14": Type: IMPLANTABLE DEVICE | Status: FUNCTIONAL

## 2024-04-18 RX ORDER — ONDANSETRON 8 MG/1
1 TABLET, FILM COATED ORAL
Qty: 8 | Refills: 0
Start: 2024-04-18 | End: 2024-04-19

## 2024-04-18 RX ORDER — HYDROMORPHONE HYDROCHLORIDE 2 MG/ML
0.5 INJECTION INTRAMUSCULAR; INTRAVENOUS; SUBCUTANEOUS ONCE
Refills: 0 | Status: DISCONTINUED | OUTPATIENT
Start: 2024-04-18 | End: 2024-04-18

## 2024-04-18 RX ORDER — SODIUM CHLORIDE 9 MG/ML
1000 INJECTION, SOLUTION INTRAVENOUS
Refills: 0 | Status: DISCONTINUED | OUTPATIENT
Start: 2024-04-18 | End: 2024-04-18

## 2024-04-18 RX ORDER — ONDANSETRON 8 MG/1
4 TABLET, FILM COATED ORAL ONCE
Refills: 0 | Status: COMPLETED | OUTPATIENT
Start: 2024-04-18 | End: 2024-04-18

## 2024-04-18 RX ORDER — HYDROMORPHONE HYDROCHLORIDE 2 MG/ML
0.25 INJECTION INTRAMUSCULAR; INTRAVENOUS; SUBCUTANEOUS ONCE
Refills: 0 | Status: DISCONTINUED | OUTPATIENT
Start: 2024-04-18 | End: 2024-04-18

## 2024-04-18 RX ORDER — OXYCODONE HYDROCHLORIDE 5 MG/1
1 TABLET ORAL
Qty: 8 | Refills: 0
Start: 2024-04-18

## 2024-04-18 RX ORDER — OXYCODONE HYDROCHLORIDE 5 MG/1
5 TABLET ORAL ONCE
Refills: 0 | Status: DISCONTINUED | OUTPATIENT
Start: 2024-04-18 | End: 2024-04-18

## 2024-04-18 RX ADMIN — HYDROMORPHONE HYDROCHLORIDE 0.25 MILLIGRAM(S): 2 INJECTION INTRAMUSCULAR; INTRAVENOUS; SUBCUTANEOUS at 12:11

## 2024-04-18 RX ADMIN — HYDROMORPHONE HYDROCHLORIDE 0.5 MILLIGRAM(S): 2 INJECTION INTRAMUSCULAR; INTRAVENOUS; SUBCUTANEOUS at 12:00

## 2024-04-18 RX ADMIN — HYDROMORPHONE HYDROCHLORIDE 0.25 MILLIGRAM(S): 2 INJECTION INTRAMUSCULAR; INTRAVENOUS; SUBCUTANEOUS at 12:02

## 2024-04-18 RX ADMIN — ONDANSETRON 4 MILLIGRAM(S): 8 TABLET, FILM COATED ORAL at 12:09

## 2024-04-18 RX ADMIN — HYDROMORPHONE HYDROCHLORIDE 0.5 MILLIGRAM(S): 2 INJECTION INTRAMUSCULAR; INTRAVENOUS; SUBCUTANEOUS at 11:26

## 2024-04-18 RX ADMIN — SODIUM CHLORIDE 50 MILLILITER(S): 9 INJECTION, SOLUTION INTRAVENOUS at 06:44

## 2024-04-18 NOTE — ASU DISCHARGE PLAN (ADULT/PEDIATRIC) - CARE PROVIDER_API CALL
Reid Mackey.  Surgery  [address] [facility], [address]  [place] [address]  Phone: [phone]  Fax: [phone]  Follow Up Time:

## 2024-04-18 NOTE — BRIEF OPERATIVE NOTE - OPERATION/FINDINGS
Left transverse incision, identified sliding inguinal hernia with sigmoid colon, defect enlarged and reduced contents, hemostasis assured, mesh repair with polypropylene mesh. External oblique and skin closed in layers.

## 2024-04-18 NOTE — ASU PATIENT PROFILE, ADULT - FALL HARM RISK - UNIVERSAL INTERVENTIONS
Bed in lowest position, wheels locked, appropriate side rails in place/Call bell, personal items and telephone in reach/Instruct patient to call for assistance before getting out of bed or chair/Non-slip footwear when patient is out of bed/[place] to call system/Physically safe environment - no spills, clutter or unnecessary equipment/Purposeful Proactive Rounding/Room/bathroom lighting operational, light cord in reach

## 2024-04-18 NOTE — ASU DISCHARGE PLAN (ADULT/PEDIATRIC) - ASU DC SPECIAL INSTRUCTIONSFT
You had a hernia repair. The incision is closed with small white bandages. Please do not remove the small white bandages, they will fall off on their own in 7-10 days. May shower and pat dry gently in 2 days. You may resume a usual diet and all medications you usually take. For pain you may take tylenol every 6 hours as needed for pain, do not exceed 4000mg tylenol in a 24 hour period. May take oxycodone as prescribed for severe pain. You may resume activity as tolerated. No heavy lifting (>10 pounds)  for 6 weeks after surgery. Please call the office to follow up with your surgeon. Please call the office with any questions.

## 2024-04-18 NOTE — ASU DISCHARGE PLAN (ADULT/PEDIATRIC) - NURSING INSTRUCTIONS
Drink plenty of fluids. Keep all post-op follow up appointments. Call MD with any questions or concerns.

## 2024-04-18 NOTE — ASU DISCHARGE PLAN (ADULT/PEDIATRIC) - BATHING
CONTINUE:  Warm compresses to both eyes 2 times per day  Artificial tears 3 to 4 times per day  Maxitrol ointment at bedtime    START:  Fluorometholone 1 drop both eyes 2 times per day (morning and mid-day)  Doxycycline pills 100mg by mouth 2 times per day for 1 month, then daily for 2 months   Shower only

## 2024-04-18 NOTE — ASU PATIENT PROFILE, ADULT - ANESTHESIA, PREVIOUS REACTION, PROFILE
"my face blew up from air"/nausea/vomiting "my face blew up from air", no issues in family hx/nausea/vomiting

## 2024-04-19 PROCEDURE — 85027 COMPLETE CBC AUTOMATED: CPT

## 2024-04-19 PROCEDURE — 80053 COMPREHEN METABOLIC PANEL: CPT

## 2024-04-19 PROCEDURE — 36415 COLL VENOUS BLD VENIPUNCTURE: CPT

## 2024-04-19 PROCEDURE — G0463: CPT

## 2024-04-19 PROCEDURE — 93005 ELECTROCARDIOGRAM TRACING: CPT

## 2024-04-24 LAB — SURGICAL PATHOLOGY STUDY: SIGNIFICANT CHANGE UP

## 2024-04-26 ENCOUNTER — APPOINTMENT (OUTPATIENT)
Dept: SURGERY | Facility: CLINIC | Age: 79
End: 2024-04-26
Payer: MEDICARE

## 2024-04-26 VITALS
HEART RATE: 65 BPM | HEIGHT: 63 IN | TEMPERATURE: 97.1 F | OXYGEN SATURATION: 98 % | WEIGHT: 153 LBS | BODY MASS INDEX: 27.11 KG/M2

## 2024-04-26 DIAGNOSIS — K43.6 OTHER AND UNSPECIFIED VENTRAL HERNIA WITH OBSTRUCTION, W/OUT GANGRENE: ICD-10-CM

## 2024-04-26 PROCEDURE — 99024 POSTOP FOLLOW-UP VISIT: CPT

## 2024-04-27 PROBLEM — K43.6 IRREDUCIBLE SPIGELIAN HERNIA: Status: ACTIVE | Noted: 2024-03-27

## 2024-04-27 NOTE — PHYSICAL EXAM
[Abdominal Masses] : No abdominal masses [Abdomen Tenderness] : ~T ~M No abdominal tenderness [de-identified] : RLQ incision healing well

## 2024-05-15 ENCOUNTER — INPATIENT (INPATIENT)
Facility: HOSPITAL | Age: 79
LOS: 2 days | Discharge: HOME | DRG: 446 | End: 2024-05-18
Attending: FAMILY MEDICINE | Admitting: FAMILY MEDICINE
Payer: MEDICARE

## 2024-05-15 VITALS
TEMPERATURE: 98 F | WEIGHT: 154.98 LBS | SYSTOLIC BLOOD PRESSURE: 179 MMHG | HEART RATE: 80 BPM | HEIGHT: 60.5 IN | DIASTOLIC BLOOD PRESSURE: 81 MMHG | OXYGEN SATURATION: 98 % | RESPIRATION RATE: 19 BRPM

## 2024-05-15 DIAGNOSIS — Z87.19 PERSONAL HISTORY OF OTHER DISEASES OF THE DIGESTIVE SYSTEM: Chronic | ICD-10-CM

## 2024-05-15 DIAGNOSIS — K81.0 ACUTE CHOLECYSTITIS: ICD-10-CM

## 2024-05-15 DIAGNOSIS — Z98.890 OTHER SPECIFIED POSTPROCEDURAL STATES: Chronic | ICD-10-CM

## 2024-05-15 DIAGNOSIS — Z90.710 ACQUIRED ABSENCE OF BOTH CERVIX AND UTERUS: Chronic | ICD-10-CM

## 2024-05-15 LAB
ALBUMIN SERPL ELPH-MCNC: 3.5 G/DL — SIGNIFICANT CHANGE UP (ref 3.3–5)
ALP SERPL-CCNC: 78 U/L — SIGNIFICANT CHANGE UP (ref 40–120)
ALT FLD-CCNC: 47 U/L — HIGH (ref 10–45)
ANION GAP SERPL CALC-SCNC: 10 MMOL/L — SIGNIFICANT CHANGE UP (ref 5–17)
APPEARANCE UR: CLEAR — SIGNIFICANT CHANGE UP
AST SERPL-CCNC: 49 U/L — HIGH (ref 10–40)
BACTERIA # UR AUTO: NEGATIVE /HPF — SIGNIFICANT CHANGE UP
BASOPHILS # BLD AUTO: 0.01 K/UL — SIGNIFICANT CHANGE UP (ref 0–0.2)
BASOPHILS NFR BLD AUTO: 0.1 % — SIGNIFICANT CHANGE UP (ref 0–2)
BILIRUB SERPL-MCNC: 0.6 MG/DL — SIGNIFICANT CHANGE UP (ref 0.2–1.2)
BILIRUB UR-MCNC: NEGATIVE — SIGNIFICANT CHANGE UP
BUN SERPL-MCNC: 22 MG/DL — SIGNIFICANT CHANGE UP (ref 7–23)
CALCIUM SERPL-MCNC: 9.5 MG/DL — SIGNIFICANT CHANGE UP (ref 8.4–10.5)
CHLORIDE SERPL-SCNC: 104 MMOL/L — SIGNIFICANT CHANGE UP (ref 96–108)
CO2 SERPL-SCNC: 27 MMOL/L — SIGNIFICANT CHANGE UP (ref 22–31)
COLOR SPEC: YELLOW — SIGNIFICANT CHANGE UP
CREAT SERPL-MCNC: 1.01 MG/DL — SIGNIFICANT CHANGE UP (ref 0.5–1.3)
DIFF PNL FLD: NEGATIVE — SIGNIFICANT CHANGE UP
EGFR: 57 ML/MIN/1.73M2 — LOW
EOSINOPHIL # BLD AUTO: 0.12 K/UL — SIGNIFICANT CHANGE UP (ref 0–0.5)
EOSINOPHIL NFR BLD AUTO: 1.4 % — SIGNIFICANT CHANGE UP (ref 0–6)
EPI CELLS # UR: 1 — SIGNIFICANT CHANGE UP
GLUCOSE SERPL-MCNC: 99 MG/DL — SIGNIFICANT CHANGE UP (ref 70–99)
GLUCOSE UR QL: NEGATIVE MG/DL — SIGNIFICANT CHANGE UP
HCT VFR BLD CALC: 36.3 % — SIGNIFICANT CHANGE UP (ref 34.5–45)
HGB BLD-MCNC: 12.3 G/DL — SIGNIFICANT CHANGE UP (ref 11.5–15.5)
IMM GRANULOCYTES NFR BLD AUTO: 0.5 % — SIGNIFICANT CHANGE UP (ref 0–0.9)
KETONES UR-MCNC: NEGATIVE MG/DL — SIGNIFICANT CHANGE UP
LEUKOCYTE ESTERASE UR-ACNC: ABNORMAL
LIDOCAIN IGE QN: >375 U/L — HIGH (ref 16–77)
LYMPHOCYTES # BLD AUTO: 0.93 K/UL — LOW (ref 1–3.3)
LYMPHOCYTES # BLD AUTO: 10.9 % — LOW (ref 13–44)
MCHC RBC-ENTMCNC: 31.3 PG — SIGNIFICANT CHANGE UP (ref 27–34)
MCHC RBC-ENTMCNC: 33.9 GM/DL — SIGNIFICANT CHANGE UP (ref 32–36)
MCV RBC AUTO: 92.4 FL — SIGNIFICANT CHANGE UP (ref 80–100)
MONOCYTES # BLD AUTO: 0.74 K/UL — SIGNIFICANT CHANGE UP (ref 0–0.9)
MONOCYTES NFR BLD AUTO: 8.6 % — SIGNIFICANT CHANGE UP (ref 2–14)
NEUTROPHILS # BLD AUTO: 6.72 K/UL — SIGNIFICANT CHANGE UP (ref 1.8–7.4)
NEUTROPHILS NFR BLD AUTO: 78.5 % — HIGH (ref 43–77)
NITRITE UR-MCNC: NEGATIVE — SIGNIFICANT CHANGE UP
NRBC # BLD: 0 /100 WBCS — SIGNIFICANT CHANGE UP (ref 0–0)
PH UR: 5 — SIGNIFICANT CHANGE UP (ref 5–8)
PLATELET # BLD AUTO: 197 K/UL — SIGNIFICANT CHANGE UP (ref 150–400)
POTASSIUM SERPL-MCNC: 3.3 MMOL/L — LOW (ref 3.5–5.3)
POTASSIUM SERPL-SCNC: 3.3 MMOL/L — LOW (ref 3.5–5.3)
PROT SERPL-MCNC: 7.1 G/DL — SIGNIFICANT CHANGE UP (ref 6–8.3)
PROT UR-MCNC: NEGATIVE MG/DL — SIGNIFICANT CHANGE UP
RBC # BLD: 3.93 M/UL — SIGNIFICANT CHANGE UP (ref 3.8–5.2)
RBC # FLD: 13.3 % — SIGNIFICANT CHANGE UP (ref 10.3–14.5)
RBC CASTS # UR COMP ASSIST: 1 /HPF — SIGNIFICANT CHANGE UP (ref 0–4)
SODIUM SERPL-SCNC: 141 MMOL/L — SIGNIFICANT CHANGE UP (ref 135–145)
SP GR SPEC: 1.01 — SIGNIFICANT CHANGE UP (ref 1–1.03)
UROBILINOGEN FLD QL: 0.2 MG/DL — SIGNIFICANT CHANGE UP (ref 0.2–1)
WBC # BLD: 8.56 K/UL — SIGNIFICANT CHANGE UP (ref 3.8–10.5)
WBC # FLD AUTO: 8.56 K/UL — SIGNIFICANT CHANGE UP (ref 3.8–10.5)
WBC UR QL: 2 /HPF — SIGNIFICANT CHANGE UP (ref 0–5)

## 2024-05-15 PROCEDURE — 99223 1ST HOSP IP/OBS HIGH 75: CPT

## 2024-05-15 PROCEDURE — 99285 EMERGENCY DEPT VISIT HI MDM: CPT

## 2024-05-15 PROCEDURE — 93010 ELECTROCARDIOGRAM REPORT: CPT

## 2024-05-15 PROCEDURE — 74177 CT ABD & PELVIS W/CONTRAST: CPT | Mod: 26,MC

## 2024-05-15 RX ORDER — ACETAMINOPHEN 500 MG
650 TABLET ORAL EVERY 6 HOURS
Refills: 0 | Status: DISCONTINUED | OUTPATIENT
Start: 2024-05-15 | End: 2024-05-17

## 2024-05-15 RX ORDER — METRONIDAZOLE 500 MG
500 TABLET ORAL EVERY 8 HOURS
Refills: 0 | Status: DISCONTINUED | OUTPATIENT
Start: 2024-05-15 | End: 2024-05-17

## 2024-05-15 RX ORDER — PANTOPRAZOLE SODIUM 20 MG/1
40 TABLET, DELAYED RELEASE ORAL DAILY
Refills: 0 | Status: DISCONTINUED | OUTPATIENT
Start: 2024-05-15 | End: 2024-05-15

## 2024-05-15 RX ORDER — PANTOPRAZOLE SODIUM 20 MG/1
40 TABLET, DELAYED RELEASE ORAL DAILY
Refills: 0 | Status: DISCONTINUED | OUTPATIENT
Start: 2024-05-15 | End: 2024-05-17

## 2024-05-15 RX ORDER — POTASSIUM CHLORIDE 20 MEQ
40 PACKET (EA) ORAL ONCE
Refills: 0 | Status: COMPLETED | OUTPATIENT
Start: 2024-05-15 | End: 2024-05-15

## 2024-05-15 RX ORDER — MORPHINE SULFATE 50 MG/1
4 CAPSULE, EXTENDED RELEASE ORAL EVERY 4 HOURS
Refills: 0 | Status: DISCONTINUED | OUTPATIENT
Start: 2024-05-15 | End: 2024-05-17

## 2024-05-15 RX ORDER — LOSARTAN POTASSIUM 100 MG/1
100 TABLET, FILM COATED ORAL DAILY
Refills: 0 | Status: DISCONTINUED | OUTPATIENT
Start: 2024-05-15 | End: 2024-05-17

## 2024-05-15 RX ORDER — ONDANSETRON 8 MG/1
4 TABLET, FILM COATED ORAL EVERY 8 HOURS
Refills: 0 | Status: DISCONTINUED | OUTPATIENT
Start: 2024-05-15 | End: 2024-05-17

## 2024-05-15 RX ORDER — SODIUM CHLORIDE 9 MG/ML
1000 INJECTION, SOLUTION INTRAVENOUS
Refills: 0 | Status: DISCONTINUED | OUTPATIENT
Start: 2024-05-15 | End: 2024-05-17

## 2024-05-15 RX ORDER — CIPROFLOXACIN LACTATE 400MG/40ML
400 VIAL (ML) INTRAVENOUS EVERY 12 HOURS
Refills: 0 | Status: DISCONTINUED | OUTPATIENT
Start: 2024-05-15 | End: 2024-05-17

## 2024-05-15 RX ORDER — LANOLIN ALCOHOL/MO/W.PET/CERES
3 CREAM (GRAM) TOPICAL AT BEDTIME
Refills: 0 | Status: DISCONTINUED | OUTPATIENT
Start: 2024-05-15 | End: 2024-05-17

## 2024-05-15 RX ADMIN — Medication 40 MILLIEQUIVALENT(S): at 19:21

## 2024-05-15 RX ADMIN — Medication 650 MILLIGRAM(S): at 23:17

## 2024-05-15 RX ADMIN — Medication 650 MILLIGRAM(S): at 22:47

## 2024-05-15 RX ADMIN — SODIUM CHLORIDE 100 MILLILITER(S): 9 INJECTION, SOLUTION INTRAVENOUS at 20:05

## 2024-05-15 RX ADMIN — Medication 100 MILLIGRAM(S): at 22:41

## 2024-05-15 NOTE — ED ADULT NURSE NOTE - OBJECTIVE STATEMENT
Pt presents to the ER complaining of abdominal pain and nausea for the past two day. Pt states she had a hernia repair in April 2024. A&OX4. Pt denies SOB, chest pain, vomiting, fever, dizziness or headache.

## 2024-05-15 NOTE — ED ADULT NURSE NOTE - NS ED NOTE ABUSE SUSPICION NEGLECT YN
Abnormal Uterine Bleeding  Abnormal uterine bleeding is unusual bleeding from the uterus. It includes:    Bleeding or spotting between periods.  Bleeding after sex.  Bleeding that is heavier than normal.  Periods that last longer than usual.  Bleeding after menopause.    Abnormal uterine bleeding can affect women at various stages in life, including teenagers, women in their reproductive years, pregnant women, and women who have reached menopause. Common causes of abnormal uterine bleeding include:    Pregnancy.  Growths of tissue (polyps).  A noncancerous tumor in the uterus (fibroid).  Infection.  Cancer.  Hormonal imbalances.    ImageAny type of abnormal bleeding should be evaluated by a health care provider. Many cases are minor and simple to treat, while others are more serious. Treatment will depend on the cause of the bleeding.    Follow these instructions at home:  Monitor your condition for any changes.  Do not use tampons, douche, or have sex if told by your health care provider.  Change your pads often.  Get regular exams that include pelvic exams and cervical cancer screening.  Keep all follow-up visits as told by your health care provider. This is important.  Contact a health care provider if:  Your bleeding lasts for more than one week.  You feel dizzy at times.  You feel nauseous or you vomit.  Get help right away if:  You pass out.  Your bleeding soaks through a pad every hour.  You have abdominal pain.  You have a fever.  You become sweaty or weak.  You pass large blood clots from your vagina.  Summary  Abnormal uterine bleeding is unusual bleeding from the uterus.  Any type of abnormal bleeding should be evaluated by a health care provider. Many cases are minor and simple to treat, while others are more serious.  Treatment will depend on the cause of the bleeding.  This information is not intended to replace advice given to you by your health care provider. Make sure you discuss any questions you have with your health care provider.
No

## 2024-05-15 NOTE — ED ADULT NURSE NOTE - NSFALLUNIVINTERV_ED_ALL_ED
Bed/Stretcher in lowest position, wheels locked, appropriate side rails in place/Call bell, personal items and telephone in reach/Instruct patient to call for assistance before getting out of bed/chair/stretcher/Non-slip footwear applied when patient is off stretcher/[place] to call system/Physically safe environment - no spills, clutter or unnecessary equipment/Purposeful proactive rounding/Room/bathroom lighting operational, light cord in reach

## 2024-05-15 NOTE — ED ADULT NURSE NOTE - CCCP TRG CHIEF CMPLNT
Immediate Brief Procedure Note    Patient: Pauly Mcpherson    Pre-op Dx: left ankle fracture    Post-op Dx: same    Procedure: Left ankle fracture open reduction internal fixation    Surgeon:  Saran Helton MD    Assistants: Robb Bridges    Anesthesia Staff: Anesthesiologist: Sherine Faust MD    Anesthesia Type: General    Findings: Fracture    Estimated Blood Loss: 5cc    Complications: none    Specimens Removed: none  
abdominal pain

## 2024-05-15 NOTE — H&P ADULT - HISTORY OF PRESENT ILLNESS
77y female with PMHx of HTN, ROBIN (2/2 endometriosis) hiatal hernia repair 2 yr back, s/p laparoscopic ventral hernia repair on 4/18/24 by dr. robb, now comes in c/o severe lower abdominal pain and nausea for 2 days. which has been constant, relieved  mildly with Advil , also reports dysuria, which is currently resolved. Denies, cough, sob, trauma. Denies any other symptoms. s/p hernia repair in April 2024. Denies any other symptoms.    in ED- noted lipase level and mild elevation in lft  ctap- pending    77y female with PMHx of HTN, ROBIN (2/2 endometriosis) hiatal hernia repair 2 yr back, s/p laparoscopic ventral hernia repair on 4/18/24 by dr. robb, now comes in c/o severe lower abdominal pain and nausea for 2 days. which has been constant, relieved  mildly with Advil , also reports dysuria, which is currently resolved. Denies, cough, sob, trauma. Denies any other symptoms. s/p hernia repair in April 2024. Denies any other symptoms.    in ED- noted lipase level and mild elevation in lft    ctap- IMPRESSION:  Mildly distended gallbladder with pericholecystic fluid concerning for   acute cholecystitis. Recommend ultrasound confirmation.    Interval repair of left lower abdominal hernia with subcutaneous seroma   versus hematoma.    Redemonstrated rectocele and enterocele.

## 2024-05-15 NOTE — H&P ADULT - REASON FOR ADMISSION
abdominal pain, n/v PRINCIPAL DISCHARGE DIAGNOSIS  Diagnosis: Acute respiratory failure with hypoxia  Assessment and Plan of Treatment: Continue present medication regimen.   Cease smoking.   Follow up with PMD in 1 week.      SECONDARY DISCHARGE DIAGNOSES  Diagnosis: COPD exacerbation  Assessment and Plan of Treatment: Call your Health Care provider upon arrival home to make a follow up appointment within one week.  Take all inhalers as prescribed by your Health Care Provider.  Take steroids as prescribed by your Health Care Provider.  If your cough increases infrequency and severity and/or you have shortness of breath or increased shortness of breath call your Health Care Provider.  If you develop fever, chills, night sweats, malaise, and/or change in mental status call your Health care Provider.  Nutrition is very important.  Eat small frequent meals.  Increase your activity as tolerated.  Do not stay in bed all day

## 2024-05-15 NOTE — ED PROVIDER NOTE - IV ALTEPLASE INCLUSION HIDDEN
Called patient to schedule a Direct Access Screening Colonoscopy (DASC).  Following information obtained to determine if patient meets DASC criteria.    Exclusion Criteria:  Do you have any of the following symptoms:  Rectal bleeding, change in bowel habits, abdominal pain, anemia, unintentional weight loss?  NO  Do you currently have any nausea/vomiting, uncontrolled heartburn, difficulty swallowing, or upper abdominal pain? NO  Do you take any blood thinning medications? NO  Are you on home O2 or dialysis? NO  Have you had a recent heart attack or cardiac intervention/stent placement in the past 12 months? NO  Do you have a pacemaker/defibrillator?  NO  Weight greater than 350 pounds? NO  Additional Information:  Do have a family history of colon cancer? If yes, which family member(s) and what age diagnosed? Paternal grandfather at 60 years old.  Are you pre diabetic on medication or a diabetic? NO  Do you take medication for weight loss? NO  Does patient take a GLP-1 medication? NO  When was your last colonoscopy? 10 YEARS AGO  Reason for Colonoscopy? SCREENING      Criteria Met:  YES    Spoke with patient and scheduled patient for colonoscopy with Dr. Veras on 02-28-24 at 12:30 pm.  Instructions given to patient, patient verbalized understanding.  Informed patient that the instructions will be sent via text.  Bowel prep Trilyte electronically submitted today.               show

## 2024-05-15 NOTE — H&P ADULT - NSHPPHYSICALEXAM_GEN_ALL_CORE
Vital Signs Last 24 Hrs  T(C): 36.7 (15 May 2024 14:57), Max: 36.7 (15 May 2024 14:57)  T(F): 98.1 (15 May 2024 14:57), Max: 98.1 (15 May 2024 14:57)  HR: 80 (15 May 2024 14:57) (80 - 80)  BP: 179/81 (15 May 2024 14:57) (179/81 - 179/81)  BP(mean): --  RR: 19 (15 May 2024 14:57) (19 - 19)  SpO2: 98% (15 May 2024 14:57) (98% - 98%)    Parameters below as of 15 May 2024 14:57  Patient On (Oxygen Delivery Method): room air    GENERAL- NAD  EAR/NOSE/MOUTH/THROAT - MMM  EYES- ELIZA, conjunctiva and Sclera clear  NECK- supple  RESPIRATORY-  clear to auscultation bilaterally, non laboured breathing  CARDIOVASCULAR - SIS2, RRR  GI - soft, mild generalized tenderness  BS present  EXTREMITIES- no pedal edema  NEUROLOGY- no gross focal deficits  PSYCHIATRY- AAO X 3

## 2024-05-15 NOTE — H&P ADULT - NSHPLABSRESULTS_GEN_ALL_CORE
12.3                 141  | 27   | 22           8.56  >-----------< 197     ------------------------< 99                    36.3                 3.3  | 104  | 1.01                                         Ca 9.5   Mg x     Ph x          Labs reviewed:     CXR personally reviewed:     ECG reviewed and interpreted: < from: 12 Lead ECG (04.11.24 @ 11:49) >    Diagnosis Line Normal sinus rhythm 67    < end of copied text > 12.3                 141  | 27   | 22           8.56  >-----------< 197     ------------------------< 99                    36.3                 3.3  | 104  | 1.01                                         Ca 9.5   Mg x     Ph x          Labs reviewed:       ECG reviewed and interpreted: < from: 12 Lead ECG (04.11.24 @ 11:49) >    Diagnosis Line Normal sinus rhythm 67    < end of copied text >    < from: CT Abdomen and Pelvis w/ IV Cont (05.15.24 @ 17:05) >    IMPRESSION:  Mildly distended gallbladder with pericholecystic fluid concerning for   acute cholecystitis. Recommend ultrasound confirmation.    Interval repair of left lower abdominal hernia with subcutaneous seroma   versus hematoma.    Redemonstrated rectocele and enterocele.    < end of copied text >

## 2024-05-15 NOTE — H&P ADULT - ASSESSMENT
77y female with PMHx of HTN, ROBIN (2/2 endometriosis) hiatal hernia repair 2 yr back, s/p laparoscopic ventral hernia repair on 4/18/24 by dr. robb, now comes in c/o severe lower abdominal pain and nausea for 2 days. which has been constant, relieved  mildly with Advil , also reports dysuria, which is currently resolved. Denies, cough, sob, trauma. Denies any other symptoms. s/p hernia repair in April 2024. Denies any other symptoms.      abdominal pain, n/v - r/o pancreatitis, ctap pending  admit to med/surg  npo  iv fluids  pain meds- morphine prn  am labs  aspiration precautions  consults-   Zofran prn  ctap- pending     hypokalemia- will replete  monitor level    elevated LFT'S  Avoid hepatotoxins  monitor  repeat levels in am    HTN-   c/w ARB    Vte ppx- PAS    gi ppx- ppi     77y female with PMHx of HTN, ROBIN (2/2 endometriosis) hiatal hernia repair 2 yr back, s/p laparoscopic ventral hernia repair on 4/18/24 by dr. robb, now comes in c/o severe lower abdominal pain and nausea for 2 days. which has been constant, relieved  mildly with Advil , also reports dysuria, which is currently resolved. Denies, cough, sob, trauma. Denies any other symptoms. s/p hernia repair in April 2024. Denies any other symptoms.      abdominal pain, n/v - likely due to acute cholecystitis   admit to med/surg  npo  iv fluids  pain meds- morphine prn  am labs  aspiration precautions  consults- surgery called   Zofran prn  ctap- pending     hypokalemia- will replete  monitor level    elevated lipase and LFT'S likely due to acute cholecystitis   Avoid hepatotoxins  monitor  repeat levels in am    HTN-   c/w ARB    Vte ppx- PAS    gi ppx- ppi     77y female with PMHx of HTN, ROBIN (2/2 endometriosis) hiatal hernia repair 2 yr back, s/p laparoscopic ventral hernia repair on 4/18/24 by dr. robb, now comes in c/o severe lower abdominal pain and nausea for 2 days. which has been constant, relieved  mildly with Advil , also reports dysuria, which is currently resolved. Denies, cough, sob, trauma. Denies any other symptoms. s/p hernia repair in April 2024. Denies any other symptoms.      abdominal pain, n/v - likely due to acute cholecystitis   admit to med/surg  npo  iv fluids  pain meds- morphine prn  am labs  aspiration precautions  consults- surgery called dr. james Lentz prn  bishop/flagyl ivpb     hypokalemia- will replete  monitor level    elevated lipase and LFT'S likely due to acute cholecystitis   Avoid hepatotoxins  monitor  repeat levels in am    HTN-   c/w ARB    Vte ppx- PAS    gi ppx- ppi

## 2024-05-15 NOTE — ED PROVIDER NOTE - PRINCIPAL DIAGNOSIS
stable start ferrous sulfate  outpatient follow up with PCP to recheck in 2 weeks Acute cholecystitis

## 2024-05-15 NOTE — ED PROVIDER NOTE - CLINICAL SUMMARY MEDICAL DECISION MAKING FREE TEXT BOX
79 year old female with abdominal pain and nausea, labs reviewed- elevated lipase noted, CT showed acute cholecystitis, spoke with , will admit to medicine for IV abx, and re-eval, on NPO

## 2024-05-15 NOTE — ED ADULT NURSE NOTE - CHIEF COMPLAINT QUOTE
lower abdominal pain x 3 days a/w dry heaving, nausea. pt reports poor PO intake for last 3 days and change in normal bowel habits. pt reports hernia repair 1 month ago.

## 2024-05-15 NOTE — ED PROVIDER NOTE - OBJECTIVE STATEMENT
79 year old female with lower abdominal pain and nausea for 2 days. also reports dysuria, which is currently resolved. Denies VD, cough, sob, trauma. Denies any other symptoms. s/p hernia repair in April 2024. Denies any other symptoms. 79 year old female with abdominal pain and nausea for 2 days. also reports dysuria, which is currently resolved. Denies VD, cough, sob, trauma. Denies any other symptoms. s/p hernia repair in April 2024. Denies any other symptoms.

## 2024-05-16 ENCOUNTER — TRANSCRIPTION ENCOUNTER (OUTPATIENT)
Age: 79
End: 2024-05-16

## 2024-05-16 LAB
ALBUMIN SERPL ELPH-MCNC: 3.1 G/DL — LOW (ref 3.3–5)
ALP SERPL-CCNC: 70 U/L — SIGNIFICANT CHANGE UP (ref 40–120)
ALT FLD-CCNC: 35 U/L — SIGNIFICANT CHANGE UP (ref 10–45)
ANION GAP SERPL CALC-SCNC: 12 MMOL/L — SIGNIFICANT CHANGE UP (ref 5–17)
AST SERPL-CCNC: 33 U/L — SIGNIFICANT CHANGE UP (ref 10–40)
BASOPHILS # BLD AUTO: 0.02 K/UL — SIGNIFICANT CHANGE UP (ref 0–0.2)
BASOPHILS NFR BLD AUTO: 0.3 % — SIGNIFICANT CHANGE UP (ref 0–2)
BILIRUB SERPL-MCNC: 0.7 MG/DL — SIGNIFICANT CHANGE UP (ref 0.2–1.2)
BUN SERPL-MCNC: 16 MG/DL — SIGNIFICANT CHANGE UP (ref 7–23)
CALCIUM SERPL-MCNC: 9.1 MG/DL — SIGNIFICANT CHANGE UP (ref 8.4–10.5)
CHLORIDE SERPL-SCNC: 108 MMOL/L — SIGNIFICANT CHANGE UP (ref 96–108)
CO2 SERPL-SCNC: 23 MMOL/L — SIGNIFICANT CHANGE UP (ref 22–31)
CREAT SERPL-MCNC: 0.82 MG/DL — SIGNIFICANT CHANGE UP (ref 0.5–1.3)
EGFR: 73 ML/MIN/1.73M2 — SIGNIFICANT CHANGE UP
EOSINOPHIL # BLD AUTO: 0.16 K/UL — SIGNIFICANT CHANGE UP (ref 0–0.5)
EOSINOPHIL NFR BLD AUTO: 2.2 % — SIGNIFICANT CHANGE UP (ref 0–6)
GLUCOSE SERPL-MCNC: 99 MG/DL — SIGNIFICANT CHANGE UP (ref 70–99)
HCT VFR BLD CALC: 33.9 % — LOW (ref 34.5–45)
HGB BLD-MCNC: 11.6 G/DL — SIGNIFICANT CHANGE UP (ref 11.5–15.5)
IMM GRANULOCYTES NFR BLD AUTO: 0.3 % — SIGNIFICANT CHANGE UP (ref 0–0.9)
LYMPHOCYTES # BLD AUTO: 0.88 K/UL — LOW (ref 1–3.3)
LYMPHOCYTES # BLD AUTO: 12.2 % — LOW (ref 13–44)
MCHC RBC-ENTMCNC: 31.3 PG — SIGNIFICANT CHANGE UP (ref 27–34)
MCHC RBC-ENTMCNC: 34.2 GM/DL — SIGNIFICANT CHANGE UP (ref 32–36)
MCV RBC AUTO: 91.4 FL — SIGNIFICANT CHANGE UP (ref 80–100)
MONOCYTES # BLD AUTO: 0.68 K/UL — SIGNIFICANT CHANGE UP (ref 0–0.9)
MONOCYTES NFR BLD AUTO: 9.4 % — SIGNIFICANT CHANGE UP (ref 2–14)
NEUTROPHILS # BLD AUTO: 5.47 K/UL — SIGNIFICANT CHANGE UP (ref 1.8–7.4)
NEUTROPHILS NFR BLD AUTO: 75.6 % — SIGNIFICANT CHANGE UP (ref 43–77)
NRBC # BLD: 0 /100 WBCS — SIGNIFICANT CHANGE UP (ref 0–0)
PLATELET # BLD AUTO: 205 K/UL — SIGNIFICANT CHANGE UP (ref 150–400)
POTASSIUM SERPL-MCNC: 3.7 MMOL/L — SIGNIFICANT CHANGE UP (ref 3.5–5.3)
POTASSIUM SERPL-SCNC: 3.7 MMOL/L — SIGNIFICANT CHANGE UP (ref 3.5–5.3)
PROT SERPL-MCNC: 6.6 G/DL — SIGNIFICANT CHANGE UP (ref 6–8.3)
RBC # BLD: 3.71 M/UL — LOW (ref 3.8–5.2)
RBC # FLD: 13.3 % — SIGNIFICANT CHANGE UP (ref 10.3–14.5)
SODIUM SERPL-SCNC: 143 MMOL/L — SIGNIFICANT CHANGE UP (ref 135–145)
WBC # BLD: 7.23 K/UL — SIGNIFICANT CHANGE UP (ref 3.8–10.5)
WBC # FLD AUTO: 7.23 K/UL — SIGNIFICANT CHANGE UP (ref 3.8–10.5)

## 2024-05-16 PROCEDURE — 99222 1ST HOSP IP/OBS MODERATE 55: CPT | Mod: 24

## 2024-05-16 PROCEDURE — 99233 SBSQ HOSP IP/OBS HIGH 50: CPT

## 2024-05-16 RX ORDER — HYDROCHLOROTHIAZIDE 25 MG
1 TABLET ORAL
Qty: 0 | Refills: 0 | DISCHARGE

## 2024-05-16 RX ORDER — OMEPRAZOLE 10 MG/1
0 CAPSULE, DELAYED RELEASE ORAL
Qty: 0 | Refills: 0 | DISCHARGE

## 2024-05-16 RX ORDER — L.ACIDOPH/B.ANIMALIS/B.LONGUM 15B CELL
1 CAPSULE ORAL
Qty: 0 | Refills: 0 | DISCHARGE

## 2024-05-16 RX ORDER — FEXOFENADINE HCL 30 MG
1 TABLET ORAL
Refills: 0 | DISCHARGE

## 2024-05-16 RX ORDER — ACETAMINOPHEN 500 MG
2 TABLET ORAL
Refills: 0 | DISCHARGE

## 2024-05-16 RX ORDER — CANDESARTAN CILEXETIL 8 MG/1
1 TABLET ORAL
Qty: 0 | Refills: 0 | DISCHARGE

## 2024-05-16 RX ORDER — CHOLECALCIFEROL (VITAMIN D3) 125 MCG
1 CAPSULE ORAL
Qty: 0 | Refills: 0 | DISCHARGE

## 2024-05-16 RX ADMIN — LOSARTAN POTASSIUM 100 MILLIGRAM(S): 100 TABLET, FILM COATED ORAL at 05:21

## 2024-05-16 RX ADMIN — Medication 100 MILLIGRAM(S): at 05:22

## 2024-05-16 RX ADMIN — Medication 200 MILLIGRAM(S): at 18:07

## 2024-05-16 RX ADMIN — Medication 100 MILLIGRAM(S): at 13:50

## 2024-05-16 RX ADMIN — Medication 200 MILLIGRAM(S): at 05:22

## 2024-05-16 RX ADMIN — PANTOPRAZOLE SODIUM 40 MILLIGRAM(S): 20 TABLET, DELAYED RELEASE ORAL at 13:50

## 2024-05-16 RX ADMIN — Medication 650 MILLIGRAM(S): at 22:38

## 2024-05-16 RX ADMIN — Medication 100 MILLIGRAM(S): at 23:39

## 2024-05-16 NOTE — PATIENT PROFILE ADULT - FALL HARM RISK - UNIVERSAL INTERVENTIONS
Bed in lowest position, wheels locked, appropriate side rails in place/Call bell, personal items and telephone in reach/Instruct patient to call for assistance before getting out of bed or chair/Non-slip footwear when patient is out of bed/[place] to call system/Physically safe environment - no spills, clutter or unnecessary equipment/Purposeful Proactive Rounding/Room/bathroom lighting operational, light cord in reach

## 2024-05-16 NOTE — INPATIENT CERTIFICATION FOR MEDICARE PATIENTS - IN ORDER TO MEET MEDICARE REQUIREMENTS.
denies pain/discomfort (Rating = 0)
In order to meet Medicare requirements, the clinical documentation must support the information cited in the admission order.  Please be sure to provide detailed and clear documentation about the following in the admitting note/history and physical:

## 2024-05-16 NOTE — PROGRESS NOTE ADULT - ASSESSMENT
76yo female with PMH HTN, ROBIN (2/2 endometriosis) hiatal hernia repair 2 yr back, s/p laparoscopic ventral hernia repair on 4/18/24 w. Dr. robb, now presents w. c/o right sided abdominal pain a/w nausea x 2 days.     *Abdominal pain, n/v - likely due to Acute Cholecystitis   Elevated Lipase & Transaminitis likely in s/o above  Trend LFTs  Surgery consulted  NPO for possible surgical intervention  IVF  Pain Management PRN  Antiemetics PRN  Cont Cipro/Flagyl     *Hypokalemia  s/p repletion  Trend    *Hx HTN  Chronic, stable  Cont Losartan     *GI ppx  Pantooprazole    *DVT ppx  AC held given possible procedure  Venodynes    Dispo: Pending clinical course as above.  Pt will update her family.      78yo female with PMH HTN, ROBIN (2/2 endometriosis) hiatal hernia repair 2 yr back, s/p laparoscopic ventral hernia repair on 4/18/24 w. Dr. robb, now presents w. c/o right sided abdominal pain a/w nausea x 2 days.     *Abdominal pain, n/v - likely due to Acute Cholecystitis   Elevated Lipase & Transaminitis likely in s/o above  Trend LFTs  UA Neg  Surgery consulted  NPO for possible surgical intervention  IVF  Pain Management PRN  Antiemetics PRN  Cont Cipro/Flagyl     *Hypokalemia  s/p repletion  Trend    *Hx HTN  Chronic, stable  Cont Losartan     *GI ppx  Pantoprazole    *DVT ppx  AC held given possible procedure  Venodynes    Dispo: Pending clinical course as above.  Pt will update her family.      76yo female with PMH HTN, ROBIN (2/2 endometriosis) hiatal hernia repair 2 yr back, s/p laparoscopic ventral hernia repair on 4/18/24 w. Dr. robb, now presents w. c/o right sided abdominal pain a/w nausea x 2 days.     *Abdominal pain, n/v - likely due to Acute Cholecystitis   Elevated Lipase & Transaminitis likely in s/o above  Trend LFTs  UA Neg  Surgery consulted  NPO for possible surgical intervention  IVF  Pain Management PRN  Antiemetics PRN  Cont Cipro/Flagyl     *Hypokalemia- improved   s/p repletion  Trend    *Hx HTN  Chronic, stable  Cont Losartan     *GI ppx  Pantoprazole    *DVT ppx  AC held given possible procedure  Venodynes    Dispo: Pending clinical course as above.  Pt will update her family.

## 2024-05-16 NOTE — CONSULT NOTE ADULT - SUBJECTIVE AND OBJECTIVE BOX
PAST MEDICAL & SURGICAL HISTORY:  No pertinent past medical history.  No pertinent surgical procedures.    PFSH: All noncontributory    MEDICATIONS REVIEWED:acetaminophen     Tablet .. 650 milliGRAM(s) Oral every 6 hours PRN  aluminum hydroxide/magnesium hydroxide/simethicone Suspension 30 milliLiter(s) Oral every 4 hours PRN  ciprofloxacin   IVPB 400 milliGRAM(s) IV Intermittent every 12 hours  dextrose 5% + sodium chloride 0.45%. 1000 milliLiter(s) IV Continuous <Continuous>  losartan 100 milliGRAM(s) Oral daily  melatonin 3 milliGRAM(s) Oral at bedtime PRN  metroNIDAZOLE  IVPB 500 milliGRAM(s) IV Intermittent every 8 hours  morphine  - Injectable 4 milliGRAM(s) IV Push every 4 hours PRN  ondansetron Injectable 4 milliGRAM(s) IV Push every 8 hours PRN  pantoprazole  Injectable 40 milliGRAM(s) IV Push daily      ALLERGIES:No Known Allergies      REVIEW OF SYSTEMS:  Comprehensive Review of Systems negative except as noted in HPI      PHYSICAL EXAMINATION:  RESPIRATORY: Clear to auscultation bilaterally, respirations non-labored.  CARDIAC: RR, normal S1S2, no murmurs.  ABDOMEN: Tender RUQ, soft, BS present, no masses, no hernias, no peritoneal signs, no KLS  VASCULAR: Equal and normal pulses.  MUSCULOSKELETAL: Full ROM, no deformities.      T(C): 36.7 (05-15-24 @ 14:57), Max: 36.7 (05-15-24 @ 14:57)  HR: 70 (05-16-24 @ 05:17) (70 - 84)  BP: 154/85 (05-16-24 @ 05:17) (154/85 - 179/81)  RR: 17 (05-16-24 @ 05:17) (17 - 19)  SpO2: 96% (05-16-24 @ 05:17) (96% - 98%)                          11.6   7.23  )-----------( 205      ( 16 May 2024 08:31 )             33.9       05-16    143  |  108  |  16  ----------------------------<  99  3.7   |  23  |  0.82    Ca    9.1      16 May 2024 08:31    TPro  6.6  /  Alb  3.1<L>  /  TBili  0.7  /  DBili  x   /  AST  33  /  ALT  35  /  AlkPhos  70  05-16

## 2024-05-16 NOTE — CONSULT NOTE ADULT - TIME BILLING
Discussion regarding all options and  risks; all lab values and imaging studies reviewed; discussed with Medicine

## 2024-05-16 NOTE — CONSULT NOTE ADULT - ASSESSMENT
Triage note    Caller name: Sadaf  Relation to patient: self    Vomited x2 this AM. Pain worse following vomiting.  Sour stomach  Lightheaded/dizziness  Pain in lower abdomen - midline - pain currently 9/10.   States she's having significant difficulty staying awake and feels confused this AM.      Disposition: per protocol, call 911. She verbalized understanding and agrees with plan.       Umm Jose RN  [facility] Nurse Advisor           Reason for Disposition    Difficult to awaken or acting confused (e.g., disoriented, slurred speech)    Additional Information    Negative: Shock suspected (e.g., cold/pale/clammy skin, too weak to stand, low BP, rapid pulse)    Protocols used: VOMITING-A-OH      
IMPRESSION: Acute cholecystitis    PLAN: Begin PO fluids           Continue MATTY           Monitor LFT's, WBC           If does not improve, LAP GB tomorrow           If tolerates PO, can probably discharge tomorrow and perform LAP GB in 2-3 weeks

## 2024-05-17 ENCOUNTER — RESULT REVIEW (OUTPATIENT)
Age: 79
End: 2024-05-17

## 2024-05-17 LAB
ALBUMIN SERPL ELPH-MCNC: 3.2 G/DL — LOW (ref 3.3–5)
ALP SERPL-CCNC: 70 U/L — SIGNIFICANT CHANGE UP (ref 40–120)
ALT FLD-CCNC: 28 U/L — SIGNIFICANT CHANGE UP (ref 10–45)
ANION GAP SERPL CALC-SCNC: 12 MMOL/L — SIGNIFICANT CHANGE UP (ref 5–17)
AST SERPL-CCNC: 27 U/L — SIGNIFICANT CHANGE UP (ref 10–40)
BILIRUB DIRECT SERPL-MCNC: 0.1 MG/DL — SIGNIFICANT CHANGE UP (ref 0–0.3)
BILIRUB INDIRECT FLD-MCNC: 0.4 MG/DL — SIGNIFICANT CHANGE UP (ref 0.2–1)
BILIRUB SERPL-MCNC: 0.5 MG/DL — SIGNIFICANT CHANGE UP (ref 0.2–1.2)
BLD GP AB SCN SERPL QL: SIGNIFICANT CHANGE UP
BUN SERPL-MCNC: 15 MG/DL — SIGNIFICANT CHANGE UP (ref 7–23)
CALCIUM SERPL-MCNC: 9.4 MG/DL — SIGNIFICANT CHANGE UP (ref 8.4–10.5)
CHLORIDE SERPL-SCNC: 105 MMOL/L — SIGNIFICANT CHANGE UP (ref 96–108)
CO2 SERPL-SCNC: 22 MMOL/L — SIGNIFICANT CHANGE UP (ref 22–31)
CREAT SERPL-MCNC: 0.79 MG/DL — SIGNIFICANT CHANGE UP (ref 0.5–1.3)
EGFR: 77 ML/MIN/1.73M2 — SIGNIFICANT CHANGE UP
GLUCOSE SERPL-MCNC: 99 MG/DL — SIGNIFICANT CHANGE UP (ref 70–99)
HCT VFR BLD CALC: 35.2 % — SIGNIFICANT CHANGE UP (ref 34.5–45)
HGB BLD-MCNC: 12.1 G/DL — SIGNIFICANT CHANGE UP (ref 11.5–15.5)
LIDOCAIN IGE QN: 68 U/L — SIGNIFICANT CHANGE UP (ref 16–77)
MCHC RBC-ENTMCNC: 31.4 PG — SIGNIFICANT CHANGE UP (ref 27–34)
MCHC RBC-ENTMCNC: 34.4 GM/DL — SIGNIFICANT CHANGE UP (ref 32–36)
MCV RBC AUTO: 91.4 FL — SIGNIFICANT CHANGE UP (ref 80–100)
NRBC # BLD: 0 /100 WBCS — SIGNIFICANT CHANGE UP (ref 0–0)
PLATELET # BLD AUTO: 226 K/UL — SIGNIFICANT CHANGE UP (ref 150–400)
POTASSIUM SERPL-MCNC: 3.5 MMOL/L — SIGNIFICANT CHANGE UP (ref 3.5–5.3)
POTASSIUM SERPL-SCNC: 3.5 MMOL/L — SIGNIFICANT CHANGE UP (ref 3.5–5.3)
PROT SERPL-MCNC: 6.7 G/DL — SIGNIFICANT CHANGE UP (ref 6–8.3)
RBC # BLD: 3.85 M/UL — SIGNIFICANT CHANGE UP (ref 3.8–5.2)
RBC # FLD: 13.2 % — SIGNIFICANT CHANGE UP (ref 10.3–14.5)
SODIUM SERPL-SCNC: 139 MMOL/L — SIGNIFICANT CHANGE UP (ref 135–145)
WBC # BLD: 6.32 K/UL — SIGNIFICANT CHANGE UP (ref 3.8–10.5)
WBC # FLD AUTO: 6.32 K/UL — SIGNIFICANT CHANGE UP (ref 3.8–10.5)

## 2024-05-17 PROCEDURE — 47562 LAPAROSCOPIC CHOLECYSTECTOMY: CPT

## 2024-05-17 PROCEDURE — 99233 SBSQ HOSP IP/OBS HIGH 50: CPT | Mod: 25

## 2024-05-17 PROCEDURE — 99232 SBSQ HOSP IP/OBS MODERATE 35: CPT

## 2024-05-17 PROCEDURE — 88304 TISSUE EXAM BY PATHOLOGIST: CPT | Mod: 26

## 2024-05-17 DEVICE — CLIP APPLIER COVIDIEN ENDOCLIP II 10MM MED/LG: Type: IMPLANTABLE DEVICE | Status: FUNCTIONAL

## 2024-05-17 RX ORDER — LOSARTAN POTASSIUM 100 MG/1
100 TABLET, FILM COATED ORAL DAILY
Refills: 0 | Status: DISCONTINUED | OUTPATIENT
Start: 2024-05-18 | End: 2024-05-18

## 2024-05-17 RX ORDER — OXYCODONE HYDROCHLORIDE 5 MG/1
5 TABLET ORAL EVERY 6 HOURS
Refills: 0 | Status: DISCONTINUED | OUTPATIENT
Start: 2024-05-17 | End: 2024-05-18

## 2024-05-17 RX ORDER — ENOXAPARIN SODIUM 100 MG/ML
40 INJECTION SUBCUTANEOUS EVERY 24 HOURS
Refills: 0 | Status: DISCONTINUED | OUTPATIENT
Start: 2024-05-18 | End: 2024-05-18

## 2024-05-17 RX ORDER — ONDANSETRON 8 MG/1
4 TABLET, FILM COATED ORAL EVERY 6 HOURS
Refills: 0 | Status: DISCONTINUED | OUTPATIENT
Start: 2024-05-17 | End: 2024-05-18

## 2024-05-17 RX ORDER — HYDROMORPHONE HYDROCHLORIDE 2 MG/ML
0.25 INJECTION INTRAMUSCULAR; INTRAVENOUS; SUBCUTANEOUS
Refills: 0 | Status: DISCONTINUED | OUTPATIENT
Start: 2024-05-17 | End: 2024-05-17

## 2024-05-17 RX ORDER — OXYCODONE HYDROCHLORIDE 5 MG/1
10 TABLET ORAL EVERY 6 HOURS
Refills: 0 | Status: DISCONTINUED | OUTPATIENT
Start: 2024-05-17 | End: 2024-05-18

## 2024-05-17 RX ORDER — LANOLIN ALCOHOL/MO/W.PET/CERES
3 CREAM (GRAM) TOPICAL AT BEDTIME
Refills: 0 | Status: DISCONTINUED | OUTPATIENT
Start: 2024-05-17 | End: 2024-05-18

## 2024-05-17 RX ORDER — SODIUM CHLORIDE 9 MG/ML
1000 INJECTION, SOLUTION INTRAVENOUS
Refills: 0 | Status: DISCONTINUED | OUTPATIENT
Start: 2024-05-17 | End: 2024-05-17

## 2024-05-17 RX ORDER — SODIUM CHLORIDE 9 MG/ML
1000 INJECTION, SOLUTION INTRAVENOUS
Refills: 0 | Status: DISCONTINUED | OUTPATIENT
Start: 2024-05-17 | End: 2024-05-18

## 2024-05-17 RX ORDER — HYDROMORPHONE HYDROCHLORIDE 2 MG/ML
0.5 INJECTION INTRAMUSCULAR; INTRAVENOUS; SUBCUTANEOUS
Refills: 0 | Status: DISCONTINUED | OUTPATIENT
Start: 2024-05-17 | End: 2024-05-17

## 2024-05-17 RX ORDER — PANTOPRAZOLE SODIUM 20 MG/1
40 TABLET, DELAYED RELEASE ORAL DAILY
Refills: 0 | Status: DISCONTINUED | OUTPATIENT
Start: 2024-05-18 | End: 2024-05-18

## 2024-05-17 RX ORDER — CEFAZOLIN SODIUM 1 G
2000 VIAL (EA) INJECTION EVERY 8 HOURS
Refills: 0 | Status: COMPLETED | OUTPATIENT
Start: 2024-05-17 | End: 2024-05-17

## 2024-05-17 RX ORDER — ACETAMINOPHEN 500 MG
650 TABLET ORAL EVERY 6 HOURS
Refills: 0 | Status: DISCONTINUED | OUTPATIENT
Start: 2024-05-17 | End: 2024-05-18

## 2024-05-17 RX ORDER — ONDANSETRON 8 MG/1
4 TABLET, FILM COATED ORAL ONCE
Refills: 0 | Status: COMPLETED | OUTPATIENT
Start: 2024-05-17 | End: 2024-05-17

## 2024-05-17 RX ADMIN — Medication 100 MILLIGRAM(S): at 22:49

## 2024-05-17 RX ADMIN — Medication 200 MILLIGRAM(S): at 09:45

## 2024-05-17 RX ADMIN — Medication 650 MILLIGRAM(S): at 21:50

## 2024-05-17 RX ADMIN — Medication 650 MILLIGRAM(S): at 20:51

## 2024-05-17 RX ADMIN — Medication 100 MILLIGRAM(S): at 06:04

## 2024-05-17 RX ADMIN — HYDROMORPHONE HYDROCHLORIDE 0.5 MILLIGRAM(S): 2 INJECTION INTRAMUSCULAR; INTRAVENOUS; SUBCUTANEOUS at 15:51

## 2024-05-17 RX ADMIN — ONDANSETRON 4 MILLIGRAM(S): 8 TABLET, FILM COATED ORAL at 20:47

## 2024-05-17 RX ADMIN — ONDANSETRON 4 MILLIGRAM(S): 8 TABLET, FILM COATED ORAL at 15:36

## 2024-05-17 RX ADMIN — HYDROMORPHONE HYDROCHLORIDE 0.5 MILLIGRAM(S): 2 INJECTION INTRAMUSCULAR; INTRAVENOUS; SUBCUTANEOUS at 16:06

## 2024-05-17 RX ADMIN — HYDROMORPHONE HYDROCHLORIDE 0.5 MILLIGRAM(S): 2 INJECTION INTRAMUSCULAR; INTRAVENOUS; SUBCUTANEOUS at 16:26

## 2024-05-17 RX ADMIN — HYDROMORPHONE HYDROCHLORIDE 0.5 MILLIGRAM(S): 2 INJECTION INTRAMUSCULAR; INTRAVENOUS; SUBCUTANEOUS at 16:16

## 2024-05-17 RX ADMIN — LOSARTAN POTASSIUM 100 MILLIGRAM(S): 100 TABLET, FILM COATED ORAL at 06:02

## 2024-05-17 RX ADMIN — PANTOPRAZOLE SODIUM 40 MILLIGRAM(S): 20 TABLET, DELAYED RELEASE ORAL at 11:32

## 2024-05-17 NOTE — PROGRESS NOTE ADULT - ASSESSMENT
76yo female with PMH HTN, ROBIN (2/2 endometriosis) hiatal hernia repair 2 yr back, s/p laparoscopic ventral hernia repair on 4/18/24 w. Dr. mackey, now presents w. c/o right sided abdominal pain a/w nausea x 2 days.     1. Abdominal pain, n/v - likely due to acute Cholecystitis and gallstone pancreatitis?   - Elevated Lipase & Transaminitis likely in s/o above  - Trend LFTs  - NPO for possible surgical intervention   - c/w IVF while NPO   - c/w Cipro/Flagyl   - Anti-emetics and pain control PRN   - I spoke to surgery PA today as pt did not tolerate CLD today, will discuss with Dr. Mackey for possible lap cholecystectomy today vs. outpatient     2. Possible subcutaneous seroma vs. hematoma (s/p repair of left lower abdominal hernia) and rectocele and enterocele   - f/u with surgery for further management     3. History of HTN, ROBIN (2/2 endometriosis) hiatal hernia repair 2 yr back, s/p laparoscopic ventral hernia repair on 4/18/24  - c/w home medications     DVT ppx: SCDs   GI ppx: Pantoprazole   Code status: Full code   Emergency contact: Karen Balderas (daughter) [phone]   Dispo: pending surgery recs -> if no plans for surgery will d/c home     I spent a total of 30 minutes on the date of this encounter coordinating the patient's care. This includes reviewing results/imaging and discussions with specialists, nursing, case management/social work. Further tests, medications, and procedures have been ordered as indicated. Results and the plan of care were communicated to the patient and/or their family member. Supporting documentation was completed and added to the patient's chart.    78yo female with PMH HTN, ROBIN (2/2 endometriosis) hiatal hernia repair 2 yr back, s/p laparoscopic ventral hernia repair on 4/18/24 w. Dr. mackey, now presents w. c/o right sided abdominal pain a/w nausea x 2 days.     1. Abdominal pain, n/v - likely due to acute Cholecystitis and gallstone pancreatitis?   - Elevated Lipase & Transaminitis likely in s/o above  - Trend LFTs  - NPO for possible surgical intervention   - c/w IVF while NPO   - c/w Cipro/Flagyl   - Anti-emetics and pain control PRN   - I spoke to Dr. Mackey today and the pt will have surgery today     2. Possible subcutaneous seroma vs. hematoma (s/p repair of left lower abdominal hernia) and rectocele and enterocele   - f/u with surgery for further management     3. History of HTN, ROBIN (2/2 endometriosis) hiatal hernia repair 2 yr back, s/p laparoscopic ventral hernia repair on 4/18/24  - c/w home medications     DVT ppx: SCDs   GI ppx: Pantoprazole   Code status: Full code   Emergency contact: Karen Balderas (daughter) [phone] -> I updated the pt's daughter over the phone   Dispo: acute cholecystectomy today -> anticipate d/c in 24-48 hours depending on if surgery are laparoscopic or open     I spent a total of 30 minutes on the date of this encounter coordinating the patient's care. This includes reviewing results/imaging and discussions with specialists, nursing, case management/social work. Further tests, medications, and procedures have been ordered as indicated. Results and the plan of care were communicated to the patient and/or their family member. Supporting documentation was completed and added to the patient's chart.    78yo female with PMH HTN, ROBIN (2/2 endometriosis) hiatal hernia repair 2 yr back, s/p laparoscopic ventral hernia repair on 4/18/24 w. Dr. mackey, now presents w. c/o right sided abdominal pain a/w nausea x 2 days.     1. Abdominal pain, n/v - likely due to acute Cholecystitis and gallstone pancreatitis?   - Elevated Lipase & Transaminitis likely in s/o above  - Trend LFTs  - NPO for possible surgical intervention   - c/w IVF while NPO   - c/w Cipro/Flagyl   - Anti-emetics and pain control PRN   - I spoke to Dr. Mackey today and the pt will have surgery today     2. Possible subcutaneous seroma vs. hematoma (s/p repair of left lower abdominal hernia) and rectocele and enterocele   - f/u with surgery for further management     3. History of HTN, ROBIN (2/2 endometriosis) hiatal hernia repair 2 yr back, s/p laparoscopic ventral hernia repair on 4/18/24  - c/w home medications     DVT ppx: SCDs   GI ppx: Pantoprazole   Code status: Full code   Emergency contact: Karen Balderas (daughter) [phone] -> I updated the pt's daughter over the phone   Dispo: acute cholecystectomy today -> anticipate d/c in 24-48 hours depending on if surgery are laparoscopic or open     I spent a total of 35 minutes on the date of this encounter coordinating the patient's care. This includes reviewing results/imaging and discussions with specialists, nursing, case management/social work. Further tests, medications, and procedures have been ordered as indicated. Results and the plan of care were communicated to the patient and/or their family member. Supporting documentation was completed and added to the patient's chart.

## 2024-05-17 NOTE — PROGRESS NOTE ADULT - ASSESSMENT
79y Female admitted POD #0 from laprascopic cholecystectomy        PLAN:      -serial exams, any changes in exam to be escelated to surgical team immedietly  -pain control  -finish post op anbx  -IVFs until taking adequate PO  -advance diet per surgical team  -dressing changes per surgical team  -DVT prophylaxis  -AM labs         TIME SPENT:  55 minutes of  time spent providing medical care for patient's acute illness/conditions that impairs at least one vital organ system and/or poses a high risk of imminent or life threatening deterioration in the patient's condition. It includes time spent evaluating and treating the patient's acute illness as well as time spent reviewing labs, radiology, discussing goals of care with patient and/or patient's family, and discussing the case with a multidisciplinary team, including the eICU, in an effort to prevent further life threatening deterioration or end organ damage. This time is independent of any procedures performed.    DATE OF ENTRY OF THIS NOTE IS EQUAL TO THE DATE OF SERVICES RENDERED

## 2024-05-17 NOTE — PRE-OP CHECKLIST - SITE MARKED BY ANESTHESIOLOGIST
[facility] [employer] of Child Health Examination       Student's Name  Vargas Herron Date     Signature                                                                                                                                              Title                           Date    (If adding dates to the above immu ALLERGIES  (Food, drug, insect, other)  Patient has no known allergies. MEDICATION  (List all prescribed or taken on a regular basis.)  No current outpatient prescriptions on file. Diagnosis of asthma?   Child wakes during the night coughing   Yes   No DIABETES SCREENING  BMI>85% age/sex  No And any two of the following:  Family History Yes    Ethnic Minority  No          Signs of Insulin Resistance (hypertension, dyslipidemia, polycystic ovarian syndrome, acanthosis nigricans)    No           At Risk  N Quick-relief  medication (e.g. Short Acting Beta Antagonist): No          Controller medication (e.g. inhaled corticosteroid):   No Other   NEEDS/MODIFICATIONS required in the school setting  None DIETARY Needs/Restrictions     None   SPECIAL INSTR n/a

## 2024-05-18 ENCOUNTER — TRANSCRIPTION ENCOUNTER (OUTPATIENT)
Age: 79
End: 2024-05-18

## 2024-05-18 VITALS
RESPIRATION RATE: 16 BRPM | DIASTOLIC BLOOD PRESSURE: 48 MMHG | TEMPERATURE: 98 F | HEART RATE: 78 BPM | SYSTOLIC BLOOD PRESSURE: 144 MMHG | OXYGEN SATURATION: 97 %

## 2024-05-18 LAB
ALBUMIN SERPL ELPH-MCNC: 3 G/DL — LOW (ref 3.3–5)
ALP SERPL-CCNC: 76 U/L — SIGNIFICANT CHANGE UP (ref 40–120)
ALT FLD-CCNC: 29 U/L — SIGNIFICANT CHANGE UP (ref 10–45)
AMYLASE P1 CFR SERPL: 56 U/L — SIGNIFICANT CHANGE UP (ref 25–125)
ANION GAP SERPL CALC-SCNC: 9 MMOL/L — SIGNIFICANT CHANGE UP (ref 5–17)
AST SERPL-CCNC: 63 U/L — HIGH (ref 10–40)
BILIRUB SERPL-MCNC: 0.3 MG/DL — SIGNIFICANT CHANGE UP (ref 0.2–1.2)
BUN SERPL-MCNC: 14 MG/DL — SIGNIFICANT CHANGE UP (ref 7–23)
CALCIUM SERPL-MCNC: 8.8 MG/DL — SIGNIFICANT CHANGE UP (ref 8.4–10.5)
CHLORIDE SERPL-SCNC: 104 MMOL/L — SIGNIFICANT CHANGE UP (ref 96–108)
CO2 SERPL-SCNC: 24 MMOL/L — SIGNIFICANT CHANGE UP (ref 22–31)
CREAT SERPL-MCNC: 0.84 MG/DL — SIGNIFICANT CHANGE UP (ref 0.5–1.3)
EGFR: 71 ML/MIN/1.73M2 — SIGNIFICANT CHANGE UP
GLUCOSE SERPL-MCNC: 152 MG/DL — HIGH (ref 70–99)
HCT VFR BLD CALC: 34.2 % — LOW (ref 34.5–45)
HGB BLD-MCNC: 11.8 G/DL — SIGNIFICANT CHANGE UP (ref 11.5–15.5)
LIDOCAIN IGE QN: 72 U/L — SIGNIFICANT CHANGE UP (ref 16–77)
MCHC RBC-ENTMCNC: 31.4 PG — SIGNIFICANT CHANGE UP (ref 27–34)
MCHC RBC-ENTMCNC: 34.5 GM/DL — SIGNIFICANT CHANGE UP (ref 32–36)
MCV RBC AUTO: 91 FL — SIGNIFICANT CHANGE UP (ref 80–100)
NRBC # BLD: 0 /100 WBCS — SIGNIFICANT CHANGE UP (ref 0–0)
PLATELET # BLD AUTO: 209 K/UL — SIGNIFICANT CHANGE UP (ref 150–400)
POTASSIUM SERPL-MCNC: 4.1 MMOL/L — SIGNIFICANT CHANGE UP (ref 3.5–5.3)
POTASSIUM SERPL-SCNC: 4.1 MMOL/L — SIGNIFICANT CHANGE UP (ref 3.5–5.3)
PROT SERPL-MCNC: 6.5 G/DL — SIGNIFICANT CHANGE UP (ref 6–8.3)
RBC # BLD: 3.76 M/UL — LOW (ref 3.8–5.2)
RBC # FLD: 12.9 % — SIGNIFICANT CHANGE UP (ref 10.3–14.5)
SODIUM SERPL-SCNC: 137 MMOL/L — SIGNIFICANT CHANGE UP (ref 135–145)
WBC # BLD: 7.49 K/UL — SIGNIFICANT CHANGE UP (ref 3.8–10.5)
WBC # FLD AUTO: 7.49 K/UL — SIGNIFICANT CHANGE UP (ref 3.8–10.5)

## 2024-05-18 PROCEDURE — 99239 HOSP IP/OBS DSCHRG MGMT >30: CPT

## 2024-05-18 PROCEDURE — 99285 EMERGENCY DEPT VISIT HI MDM: CPT | Mod: 25

## 2024-05-18 PROCEDURE — 82150 ASSAY OF AMYLASE: CPT

## 2024-05-18 PROCEDURE — 85025 COMPLETE CBC W/AUTO DIFF WBC: CPT

## 2024-05-18 PROCEDURE — 83690 ASSAY OF LIPASE: CPT

## 2024-05-18 PROCEDURE — 80048 BASIC METABOLIC PNL TOTAL CA: CPT

## 2024-05-18 PROCEDURE — 86850 RBC ANTIBODY SCREEN: CPT

## 2024-05-18 PROCEDURE — 81001 URINALYSIS AUTO W/SCOPE: CPT

## 2024-05-18 PROCEDURE — 80053 COMPREHEN METABOLIC PANEL: CPT

## 2024-05-18 PROCEDURE — 36415 COLL VENOUS BLD VENIPUNCTURE: CPT

## 2024-05-18 PROCEDURE — C9399: CPT

## 2024-05-18 PROCEDURE — 93005 ELECTROCARDIOGRAM TRACING: CPT

## 2024-05-18 PROCEDURE — 80076 HEPATIC FUNCTION PANEL: CPT

## 2024-05-18 PROCEDURE — 86901 BLOOD TYPING SEROLOGIC RH(D): CPT

## 2024-05-18 PROCEDURE — 86900 BLOOD TYPING SEROLOGIC ABO: CPT

## 2024-05-18 PROCEDURE — 85027 COMPLETE CBC AUTOMATED: CPT

## 2024-05-18 PROCEDURE — 88304 TISSUE EXAM BY PATHOLOGIST: CPT

## 2024-05-18 PROCEDURE — 74177 CT ABD & PELVIS W/CONTRAST: CPT | Mod: MC

## 2024-05-18 RX ORDER — METRONIDAZOLE 500 MG
500 TABLET ORAL ONCE
Refills: 0 | Status: COMPLETED | OUTPATIENT
Start: 2024-05-18 | End: 2024-05-18

## 2024-05-18 RX ORDER — METRONIDAZOLE 500 MG
TABLET ORAL
Refills: 0 | Status: DISCONTINUED | OUTPATIENT
Start: 2024-05-18 | End: 2024-05-18

## 2024-05-18 RX ORDER — OXYCODONE HYDROCHLORIDE 5 MG/1
1 TABLET ORAL
Qty: 20 | Refills: 0
Start: 2024-05-18 | End: 2024-05-22

## 2024-05-18 RX ORDER — CIPROFLOXACIN LACTATE 400MG/40ML
400 VIAL (ML) INTRAVENOUS EVERY 12 HOURS
Refills: 0 | Status: DISCONTINUED | OUTPATIENT
Start: 2024-05-18 | End: 2024-05-18

## 2024-05-18 RX ORDER — METRONIDAZOLE 500 MG
500 TABLET ORAL EVERY 8 HOURS
Refills: 0 | Status: DISCONTINUED | OUTPATIENT
Start: 2024-05-18 | End: 2024-05-18

## 2024-05-18 RX ORDER — ACETAMINOPHEN 500 MG
2 TABLET ORAL
Qty: 0 | Refills: 0 | DISCHARGE
Start: 2024-05-18

## 2024-05-18 RX ADMIN — ENOXAPARIN SODIUM 40 MILLIGRAM(S): 100 INJECTION SUBCUTANEOUS at 05:49

## 2024-05-18 RX ADMIN — Medication 650 MILLIGRAM(S): at 09:26

## 2024-05-18 RX ADMIN — LOSARTAN POTASSIUM 100 MILLIGRAM(S): 100 TABLET, FILM COATED ORAL at 05:48

## 2024-05-18 RX ADMIN — Medication 100 MILLIGRAM(S): at 08:58

## 2024-05-18 RX ADMIN — Medication 650 MILLIGRAM(S): at 08:56

## 2024-05-18 RX ADMIN — PANTOPRAZOLE SODIUM 40 MILLIGRAM(S): 20 TABLET, DELAYED RELEASE ORAL at 11:06

## 2024-05-18 NOTE — PROGRESS NOTE ADULT - ASSESSMENT
78yo female with PMH HTN, ROBIN (2/2 endometriosis) hiatal hernia repair 2 yr back, s/p laparoscopic ventral hernia repair on 4/18/24 w. Dr. mackey, now presents w. c/o right sided abdominal pain a/w nausea x 2 days.     #Abdominal pain sec/ to acute Cholecystitis  #POD #1, Lap ady  -Dr. Mackey, Surgery f/u today  -c/w Cipro/Flagyl   -cont full liquid diet, upgrade per surgery  -cont pain mgmt  -IV protonix    #Possible subcutaneous seroma vs. hematoma (s/p repair of left lower abdominal hernia) and rectocele and enterocele   - f/u with surgery for further management     # History of HTN, ROBIN (2/2 endometriosis) hiatal hernia repair 2 yr back, s/p laparoscopic ventral hernia repair on 4/18/24  - c/w home medications     DVT ppx: SCDs   GI ppx: Pantoprazole   Code status: Full code     Dispo:  D/C planning in the next 24-48 hrs, await surgery input today.  Emergency contact: Karen Balderas (daughter) [phone]

## 2024-05-18 NOTE — PROGRESS NOTE ADULT - NS ATTEND AMEND GEN_ALL_CORE FT
agree with above  tolerated lap ady  full liquids this am, tolerating  solids for lunch  if tolerates for lunch can be discharged this afternoon per surgery  medically stable otherwise
PE: A+Ox3, no murmurs, abd soft, mild RUQ  tenderness, negative Hernandez's, no lower extremity swelling    Assessment:   - Abdominal pain secondary to acute cholecystitis   - Possible subcutaneous seroma vs. hematoma (s/p repair of left lower abdominal hernia)   - Rectocele and enterocele   - Elevated lipase and LFTs, possible gallstone pancreatitis?   - Hypokalemia (improved)   - History of HTN, ROBIN (2/2 endometriosis) hiatal hernia repair 2 yr back, s/p laparoscopic ventral hernia repair on 4/18/24     Plan:   - We did a trial of CLD this morning and pt had pain and nausea   - Will speak to Dr. De Luna may need to be NPO again   - If pain worsening/not improving -> cholecystectomy tomorrow   - If pt tolerating diet -> plan for outpt cholecystectomy   - IVF, CLD for now, pain control, anti-emetics   - c/w Ciprofloxacin and Flagyl   - f/u with surgery in AM   - Type and screen and AM labs   - DVT ppx: SCDs -> advance to pharmacological if no plans for surgery   - Dispo: pending surgery recs -> may need lap ady tomorrow -> anticipate d/c in 24-48 hours     I spent a total of 45 minutes on the date of this encounter coordinating the patient's care. This includes reviewing results/imaging and discussions with specialists, nursing, case management/social work. Further tests, medications, and procedures have been ordered as indicated. Results and the plan of care were communicated to the patient and/or their family member. Supporting documentation was completed and added to the patient's chart.

## 2024-05-18 NOTE — DISCHARGE NOTE NURSING/CASE MANAGEMENT/SOCIAL WORK - PATIENT PORTAL LINK FT
Torres Cherelle has an appointment with you on 4/9/19 and was supposed to Plan on following with CT Abdomen/Pelvis, Chest x-ray and CMP in 6 months. She had her CT and CMP done but not her Chest XR. I spoke to her  (on HIPAA) and she is in the nursing home and he said they must not have done that along with the other test. Is it ok for her to still come to the appointment without the XR being done?      Please advise thank you You can access the FollowMyHealth Patient Portal offered by [facility] by registering at the following website: [email or web address]. By joining [employer]’s FollowMyHealth portal, you will also be able to view your health information using other applications (apps) compatible with our system.

## 2024-05-18 NOTE — DISCHARGE NOTE PROVIDER - NSDCMRMEDTOKEN_GEN_ALL_CORE_FT
Allegra 24 Hour Allergy oral tablet: 1 tab(s) orally once a day  candesartan 32 mg oral tablet: 1 tab(s) orally once a day  hydroCHLOROthiazide 12.5 mg oral capsule: 1 cap(s) orally once a day  omeprazole 20 mg oral delayed release capsule: orally 2 times a day   acetaminophen 325 mg oral tablet: 2 tab(s) orally every 6 hours As needed Temp greater or equal to 38C (100.4F), Mild Pain (1 - 3)  Allegra 24 Hour Allergy oral tablet: 1 tab(s) orally once a day  candesartan 32 mg oral tablet: 1 tab(s) orally once a day  hydroCHLOROthiazide 12.5 mg oral capsule: 1 cap(s) orally once a day  omeprazole 20 mg oral delayed release capsule: orally 2 times a day  oxyCODONE 5 mg oral tablet: 1 tab(s) orally every 6 hours as needed for Moderate Pain (4 - 6) MDD: 4 tabs

## 2024-05-18 NOTE — PROGRESS NOTE ADULT - SUBJECTIVE AND OBJECTIVE BOX
Patient is a 79y old  Female who presents with a chief complaint of abdominal pain, n/v (15 May 2024 17:44)    Patient seen and examined at bedside.  S: c/o RUQ pain. No n/v, f/c.     ALLERGIES:  No Known Allergies    MEDICATIONS:  acetaminophen     Tablet .. 650 milliGRAM(s) Oral every 6 hours PRN  aluminum hydroxide/magnesium hydroxide/simethicone Suspension 30 milliLiter(s) Oral every 4 hours PRN  ciprofloxacin   IVPB 400 milliGRAM(s) IV Intermittent every 12 hours  dextrose 5% + sodium chloride 0.45%. 1000 milliLiter(s) IV Continuous <Continuous>  losartan 100 milliGRAM(s) Oral daily  melatonin 3 milliGRAM(s) Oral at bedtime PRN  metroNIDAZOLE  IVPB 500 milliGRAM(s) IV Intermittent every 8 hours  morphine  - Injectable 4 milliGRAM(s) IV Push every 4 hours PRN  ondansetron Injectable 4 milliGRAM(s) IV Push every 8 hours PRN  pantoprazole  Injectable 40 milliGRAM(s) IV Push daily    Vital Signs Last 24 Hrs  T(F): 98.1 (15 May 2024 14:57), Max: 98.1 (15 May 2024 14:57)  HR: 70 (16 May 2024 05:17) (70 - 84)  BP: 154/85 (16 May 2024 05:17) (154/85 - 179/81)  RR: 17 (16 May 2024 05:17) (17 - 19)  SpO2: 96% (16 May 2024 05:17) (96% - 98%)  I&O's Summary      PHYSICAL EXAM:  General: NAD, A/O x 3  ENT: MMM  Lungs: Clear to auscultation bilaterally   Cardio: RR, S1/S2  Abdomen: Soft, ND, +RUQ & RLQ tenderness Normal active Bowel Sounds   Extremities: No cyanosis, No edema      LABS:                        11.6   7.23  )-----------( 205      ( 16 May 2024 08:31 )             33.9     05-15    141  |  104  |  22  ----------------------------<  99  3.3   |  27  |  1.01    Ca    9.5      15 May 2024 15:28    TPro  7.1  /  Alb  3.5  /  TBili  0.6  /  DBili  x   /  AST  49  /  ALT  47  /  AlkPhos  78  05-15        Urinalysis Basic - ( 15 May 2024 16:07 )  Color: Yellow / Appearance: Clear / S.006 / pH: x  Gluc: x / Ketone: Negative mg/dL  / Bili: Negative / Urobili: 0.2 mg/dL   Blood: x / Protein: Negative mg/dL / Nitrite: Negative   Leuk Esterase: Small / RBC: 1 /HPF / WBC 2 /HPF   Sq Epi: x / Non Sq Epi: x / Bacteria: Negative /HPF        RADIOLOGY & ADDITIONAL TESTS:    < from: CT Abdomen and Pelvis w/ IV Cont (05.15.24 @ 17:05) >  Mildly distended gallbladder with pericholecystic fluid concerning for   acute cholecystitis. Recommend ultrasound confirmation.  Interval repair of left lower abdominal hernia with subcutaneous seroma   versus hematoma.      Care Discussed with Consultants/Other Providers:   DENISA Villa discussed case and plan with Dr. Saini 
Patient is a 79y old  Female who presents with a chief complaint of abdominal pain, n/v (17 May 2024 19:17)      Patient seen and examined at bedside. No overnight events reported.  Pt c/o abd pain, but its being managed, no N/V.     ALLERGIES:  No Known Allergies    MEDICATIONS  (STANDING):  ciprofloxacin   IVPB 400 milliGRAM(s) IV Intermittent every 12 hours  enoxaparin Injectable 40 milliGRAM(s) SubCutaneous every 24 hours  lactated ringers. 1000 milliLiter(s) (75 mL/Hr) IV Continuous <Continuous>  losartan 100 milliGRAM(s) Oral daily  metroNIDAZOLE  IVPB 500 milliGRAM(s) IV Intermittent every 8 hours  metroNIDAZOLE  IVPB      metroNIDAZOLE  IVPB 500 milliGRAM(s) IV Intermittent once  pantoprazole  Injectable 40 milliGRAM(s) IV Push daily    MEDICATIONS  (PRN):  acetaminophen     Tablet .. 650 milliGRAM(s) Oral every 6 hours PRN Temp greater or equal to 38C (100.4F), Mild Pain (1 - 3)  aluminum hydroxide/magnesium hydroxide/simethicone Suspension 30 milliLiter(s) Oral every 4 hours PRN Dyspepsia  melatonin 3 milliGRAM(s) Oral at bedtime PRN Insomnia  ondansetron Injectable 4 milliGRAM(s) IV Push every 6 hours PRN Nausea  oxyCODONE    IR 5 milliGRAM(s) Oral every 6 hours PRN Moderate Pain (4 - 6)  oxyCODONE    IR 10 milliGRAM(s) Oral every 6 hours PRN Severe Pain (7 - 10)    Vital Signs Last 24 Hrs  T(F): 95.5 (18 May 2024 05:32), Max: 98.9 (17 May 2024 17:09)  HR: 61 (18 May 2024 05:32) (61 - 71)  BP: 155/62 (18 May 2024 05:32) (134/55 - 180/85)  RR: 17 (18 May 2024 05:32) (12 - 23)  SpO2: 97% (18 May 2024 05:32) (94% - 99%)  I&O's Summary    17 May 2024 07:01  -  18 May 2024 07:00  --------------------------------------------------------  IN: 100 mL / OUT: 0 mL / NET: 100 mL      PHYSICAL EXAM:  General: NAD, A/O x 3  ENT: No gross hearing impairment, Moist mucous membranes, no thrush  Neck: Supple, No JVD  Lungs: Clear to auscultation bilaterally, good air entry, non-labored breathing  Cardio: RRR, S1/S2, No murmur  Abdomen: Soft, steri-strips in place, mildly distended and tender, bowel sounds present  Extremities: No calf tenderness, No cyanosis, No pitting edema  Psych: Appropriate mood and affect    LABS:                        11.8   7.49  )-----------( 209      ( 18 May 2024 07:35 )             34.2     05-18    137  |  104  |  14  ----------------------------<  152  4.1   |  24  |  0.84    Ca    8.8      18 May 2024 07:35    TPro  6.5  /  Alb  3.0  /  TBili  0.3  /  DBili  x   /  AST  63  /  ALT  29  /  AlkPhos  76  05-18    Amylase: 56 U/L (05-18-24 @ 07:35)    Lipase: 72 U/L (05-18-24 @ 07:35)  Lipase: 68 U/L (05-17-24 @ 05:49)  Lipase: >375 U/L (05-15-24 @ 15:28)                                  Urinalysis Basic - ( 18 May 2024 07:35 )    Color: x / Appearance: x / SG: x / pH: x  Gluc: 152 mg/dL / Ketone: x  / Bili: x / Urobili: x   Blood: x / Protein: x / Nitrite: x   Leuk Esterase: x / RBC: x / WBC x   Sq Epi: x / Non Sq Epi: x / Bacteria: x          RADIOLOGY & ADDITIONAL TESTS:  < from: CT Abdomen and Pelvis w/ IV Cont (05.15.24 @ 17:05) >    IMPRESSION:  Mildly distended gallbladder with pericholecystic fluid concerning for   acute cholecystitis. Recommend ultrasound confirmation.    Interval repair of left lower abdominal hernia with subcutaneous seroma   versus hematoma.    Redemonstrated rectocele and enterocele.    < end of copied text >    Care Discussed with Consultants/Other Providers:   
Initial Eval Note    79y Female admitted POD #0 from laprascopic cholecystectomy        Vital Signs Last 24 Hrs  T(C): 37.2 (17 May 2024 17:09), Max: 37.2 (17 May 2024 17:09)  T(F): 98.9 (17 May 2024 17:09), Max: 98.9 (17 May 2024 17:09)  HR: 66 (17 May 2024 17:09) (62 - 81)  BP: 155/70 (17 May 2024 17:09) (134/55 - 180/85)  BP(mean): --  RR: 16 (17 May 2024 17:09) (12 - 23)  SpO2: 98% (17 May 2024 17:09) (94% - 99%)    Parameters below as of 17 May 2024 17:09  Patient On (Oxygen Delivery Method): nasal cannula  O2 Flow (L/min): 2                              12.1   6.32  )-----------( 226      ( 17 May 2024 05:49 )             35.2         05-17    139  |  105  |  15  ----------------------------<  99  3.5   |  22  |  0.79    Ca    9.4      17 May 2024 05:49    TPro  6.7  /  Alb  3.2<L>  /  TBili  0.5  /  DBili  0.1  /  AST  27  /  ALT  28  /  AlkPhos  70  05-17        NEURO: no headaches, blurry vision, tremors, depression, anxity  CV: no chest pain, palpitations, murmurs, orthopnea  Resp: no shortness of breath, cough, wheeze, sputum production  GI: no stomach pain,nausea, vomitting, flatulence, hematemesis, hematochezia  PV: no swelling of extremities, no hair loss, no coolness to extremities  ENDO: no polydypsia, polyphagia, polyuria, weight loss, night sweats         NEURO: awake and alert  CV: (+) S1/S2, rrr, no mrg  RESP: CTA b/l  GI: soft, non tender, surgical sites CDI            
CC: Patient is a 79y old  Female who presents with a chief complaint of abdominal pain, n/v (17 May 2024 08:48)    Interval History:  Patient seen and examined at bedside. Reports having pain with eating clear liquid diet yesterday. Denies fevers, chills, chest pain, shortness of breath, diarrhea and constipation.     ALLERGIES:  No Known Allergies    MEDICATIONS  (STANDING):    MEDICATIONS  (PRN):    Vital Signs Last 24 Hrs  T(F): 98.8 (17 May 2024 09:51), Max: 98.8 (17 May 2024 09:51)  HR: 65 (17 May 2024 13:46) (65 - 81)  BP: 151/54 (17 May 2024 13:46) (151/54 - 180/85)  RR: 18 (17 May 2024 13:46) (16 - 20)  SpO2: 98% (17 May 2024 13:46) (94% - 98%)  I&O's Summary    BMI (kg/m2): 29.3 (05-17-24 @ 13:46)    Review of Systems:   Constitutional: negative for fatigue, negative for fever, negative for chills, negative for decreased appetite.  Skin: negative for rashes, negative for open wounds, negative for jaundice.   Eyes: negative for blurry vision, negative for double vision.   Ears, nose, throat: negative for ear pain, negative for nasal congestion, negative for sore throat, negative for lymph node swelling.   Cardiovascular: negative for chest pain, negative for palpitations, negative for lower extremity swelling.   Respiratory: negative for shortness of breath, negative for wheezing, negative for cough.   Gastrointestinal: positive for abdominal pain, positive for nausea, negative for vomiting, negative for diarrhea, negative for constipation, negative for blood in the stool, negative for black tarry stools.   Genitourinary: negative for burning on urination, negative for urinary urgency or frequency, negative for blood in the urine.   Endocrine: negative for cold intolerance, negative for heat intolerance, negative for increased thirst.   Hematologic: negative for easy bruising or bleeding.   Musculoskeletal: negative for muscle/joint pain, negative for decreased range of motion.   Neurological: negative for dizziness, negative for headaches, negative for loss of consciousness, negative for motor weakness, negative for sensory deficits.   Psychiatric: negative for depression, negative for anxiety.     PHYSICAL EXAM:  General: Awake and alert, cooperative with exam. No acute distress.   Skin: Warm, dry, and pink.   Eyes: Pupils equal and reactive to light. Extraocular eye movements intact. No conjunctival injection, discharge, or scleral icterus.   HEENT: Atraumatic, normocephalic. Moist mucus membranes.  Cardiology: Normal S1, S2. No murmurs, rubs, or gallops. Regular rate and rhythm.   Respiratory: Lungs clear to ascultation bilaterally. Good air exchange. No wheezes, rales, or rhonchi. Normal chest expansion.   Gastrointestinal: Positive bowel sounds. Soft, non-tender, non-distended. Negative Hernandez's sign. No guarding, rigidity, or rebound tenderness. No hepatosplenomegaly.   Musculoskeletal: 5/5 motor strength in all extremities. Normal range of motion.   Extremities: No peripheral edema bilaterally. Dorsalis pedis pulses 2+ bilaterally.   Neurological: A+Ox3 (person, place, and time). Cranial nerves 2-12 intact. Normal speech. No facial droop. No focal neurological deficits.  Psychiatric: Normal affect. Normal mood.     LABS:                        12.1   6.32  )-----------( 226      ( 17 May 2024 05:49 )             35.2       05-17    139  |  105  |  15  ----------------------------<  99  3.5   |  22  |  0.79    Ca    9.4      17 May 2024 05:49    TPro  6.7  /  Alb  3.2  /  TBili  0.5  /  DBili  0.1  /  AST  27  /  ALT  28  /  AlkPhos  70  05-17    Urinalysis Basic - ( 17 May 2024 05:49 )    Color: x / Appearance: x / SG: x / pH: x  Gluc: 99 mg/dL / Ketone: x  / Bili: x / Urobili: x   Blood: x / Protein: x / Nitrite: x   Leuk Esterase: x / RBC: x / WBC x   Sq Epi: x / Non Sq Epi: x / Bacteria: x    Care Discussed with Consultants/Other Providers: Yes  
POD #1 Lap Appendectomy    Pt seen and examined at bedside. no new complaints no acute events overnight  no CP, sob, n/v,    + flatus no BM tolerating clears afebrile    Vital Signs Last 24 Hrs  T(C): 35.3 (18 May 2024 05:32), Max: 37.2 (17 May 2024 17:09)  T(F): 95.5 (18 May 2024 05:32), Max: 98.9 (17 May 2024 17:09)  HR: 61 (18 May 2024 05:32) (61 - 69)  BP: 155/62 (18 May 2024 05:32) (134/55 - 155/70)  BP(mean): 69 (17 May 2024 21:20) (69 - 69)  RR: 17 (18 May 2024 05:32) (12 - 23)  SpO2: 97% (18 May 2024 05:32) (94% - 99%)    Parameters below as of 18 May 2024 05:32  Patient On (Oxygen Delivery Method): nasal cannula    PE: Abd: SNT/ND + BS incisions clean and dry steris intact                          11.8   7.49  )-----------( 209      ( 18 May 2024 07:35 )             34.2   05-18    137  |  104  |  14  ----------------------------<  152<H>  4.1   |  24  |  0.84    Ca    8.8      18 May 2024 07:35    TPro  6.5  /  Alb  3.0<L>  /  TBili  0.3  /  DBili  x   /  AST  63<H>  /  ALT  29  /  AlkPhos  76  05-18      I&O's Detail    17 May 2024 07:01  -  18 May 2024 07:00  --------------------------------------------------------  IN:    Lactated Ringers: 100 mL  Total IN: 100 mL    OUT:  Total OUT: 0 mL    Total NET: 100 mL      
The patient did not tolerate PO fluids yesterday, as she experienced nausea and right flank and back pain. She remains afebrile with a normal WBC (LFT's and lipase pending.       PFSH: All noncontributory    MEDICATIONS REVIEWED:acetaminophen     Tablet .. 650 milliGRAM(s) Oral every 6 hours PRN  aluminum hydroxide/magnesium hydroxide/simethicone Suspension 30 milliLiter(s) Oral every 4 hours PRN  ciprofloxacin   IVPB 400 milliGRAM(s) IV Intermittent every 12 hours  dextrose 5% + sodium chloride 0.45%. 1000 milliLiter(s) IV Continuous <Continuous>  losartan 100 milliGRAM(s) Oral daily  melatonin 3 milliGRAM(s) Oral at bedtime PRN  metroNIDAZOLE  IVPB 500 milliGRAM(s) IV Intermittent every 8 hours  morphine  - Injectable 4 milliGRAM(s) IV Push every 4 hours PRN  ondansetron Injectable 4 milliGRAM(s) IV Push every 8 hours PRN  pantoprazole  Injectable 40 milliGRAM(s) IV Push daily      ALLERGIES:No Known Allergies      PHYSICAL EXAMINATION:  RESPIRATORY: Clear to auscultation bilaterally, respirations non-labored.  CARDIAC: RR, normal S1S2, no murmurs.  ABDOMEN: Slight tenderness RUQ, soft, BS present, no masses, no hernias, no peritoneal signs, no KLS  VASCULAR: Equal and normal pulses.  MUSCULOSKELETAL: Full ROM, no deformities.      T(C): 36.9 (05-16-24 @ 20:14), Max: 36.9 (05-16-24 @ 20:14)  HR: 74 (05-17-24 @ 06:01) (74 - 81)  BP: 166/74 (05-17-24 @ 06:01) (158/84 - 166/74)  RR: 16 (05-16-24 @ 20:14) (16 - 16)  SpO2: 96% (05-16-24 @ 20:14) (96% - 96%)                          12.1   6.32  )-----------( 226      ( 17 May 2024 05:49 )             35.2       05-17    139  |  105  |  15  ----------------------------<  99  3.5   |  22  |  0.79    Ca    9.4      17 May 2024 05:49    TPro  6.6  /  Alb  3.1<L>  /  TBili  0.7  /  DBili  x   /  AST  33  /  ALT  35  /  AlkPhos  70  05-16

## 2024-05-18 NOTE — PROGRESS NOTE ADULT - ASSESSMENT
POD #1 Lap Appendectomy  -advance diet  -OOB ambulate  -pain controlled  -d/c planning  POD #1 Lap Appendectomy  -advance diet   -OOB ambulate  -pain controlled  -d/c planning   -d/c cipro/flagyl POD #1 Lap Appendectomy  -advance diet to reg  -OOB ambulate  -pain controlled  -d/c to home later today if tolerating diet  -d/c cipro/flagyl  -does not require antibiotics for home  -f/u with dr robb in 1 week

## 2024-05-18 NOTE — DISCHARGE NOTE PROVIDER - HOSPITAL COURSE
Hospital Course  76yo female with PMH HTN, ROBIN (2/2 endometriosis) hiatal hernia repair 2 yr back, s/p laparoscopic ventral hernia repair on 4/18/24 w. Dr. mackey, now presents w. c/o right sided abdominal pain a/w nausea x 2 days.     #Abdominal pain sec/ to acute Cholecystitis  #POD #1, Lap ady  -Dr. Mackey, Surgery f/u today  -c/w Cipro/Flagyl   -cont full liquid diet, upgrade per surgery  -cont pain mgmt  -IV protonix    #Possible subcutaneous seroma vs. hematoma (s/p repair of left lower abdominal hernia) and rectocele and enterocele   - f/u with surgery for further management     # History of HTN, ROBIN (2/2 endometriosis) hiatal hernia repair 2 yr back, s/p laparoscopic ventral hernia repair on 4/18/24  - c/w home medications     DVT ppx: SCDs   GI ppx: Pantoprazole   Code status: Full code                                       Discharging Provider:  JOSEPH Aleman  Contact Info: Cell [phone] - Please call with any questions or concerns.   Hospital Course  76yo female with PMH HTN, ROBIN (2/2 endometriosis) hiatal hernia repair 2 yrs back, s/p laparoscopic ventral hernia repair on 4/18/24 w. Dr. Mackey, now presents w. c/o right sided abdominal pain a/w nausea x 2 days.   She had a CT of her abd found to have acute cholecystitis.   She was evaluated by Dr. Mackey and had Lap cholecystectomy on 5/1, she did well post-op.  She was able to tolerate solid food and was clinically stable for D/C home.  Also on CT pt with possible subcutaneous seroma vs. hematoma (s/p repair of left lower abdominal hernia) and rectocele and enterocele --she will f/u with surgery for further management.    Discharging Provider:  JOSEPH Aleman  Contact Info: Cell [phone] - Please call with any questions or concerns.

## 2024-05-18 NOTE — DISCHARGE NOTE PROVIDER - CARE PROVIDER_API CALL
Reid Mackey.  Surgery  [address], [address]  [place] [address]  Phone: [phone]  Fax: [phone]  Follow Up Time:

## 2024-05-18 NOTE — DISCHARGE NOTE PROVIDER - NSDCCPCAREPLAN_GEN_ALL_CORE_FT
PRINCIPAL DISCHARGE DIAGNOSIS  Diagnosis: Acute cholecystitis  Assessment and Plan of Treatment:   -you had laparoscopic surgery to remove your gall bladder, follow up with Dr. Mackey as advised by him next week  -continue pain management  -continue wound care per surgery team  -no heavy lifting, pushing or pulling over the next 10 days

## 2024-05-18 NOTE — PROGRESS NOTE ADULT - REASON FOR ADMISSION
abdominal pain, n/v

## 2024-05-18 NOTE — DISCHARGE NOTE NURSING/CASE MANAGEMENT/SOCIAL WORK - NSDCPEFALRISK_GEN_ALL_CORE
For information on Fall & Injury Prevention, visit: [email or web address] OR  [email or web address] OR  https://www.cdc.gov/steadi/patient.html

## 2024-05-22 ENCOUNTER — APPOINTMENT (OUTPATIENT)
Dept: SURGERY | Facility: CLINIC | Age: 79
End: 2024-05-22
Payer: MEDICARE

## 2024-05-22 VITALS
BODY MASS INDEX: 27.11 KG/M2 | TEMPERATURE: 98.6 F | HEART RATE: 76 BPM | HEIGHT: 63 IN | RESPIRATION RATE: 16 BRPM | OXYGEN SATURATION: 97 % | WEIGHT: 153 LBS

## 2024-05-22 DIAGNOSIS — K80.00 CALCULUS OF GALLBLADDER WITH ACUTE CHOLECYSTITIS W/OUT OBSTRUCTION: ICD-10-CM

## 2024-05-22 LAB — SURGICAL PATHOLOGY STUDY: SIGNIFICANT CHANGE UP

## 2024-05-22 PROCEDURE — 99024 POSTOP FOLLOW-UP VISIT: CPT

## 2024-05-22 NOTE — PHYSICAL EXAM
[Abdominal Masses] : No abdominal masses [Abdomen Tenderness] : ~T ~M No abdominal tenderness [de-identified] : all wounds healing well

## 2024-10-17 NOTE — PATIENT PROFILE ADULT - MONEY FOR FOOD
Treatment Goal Explanation (Does Not Render In The Note): Stable for the purposes of categorizing medical decision making is defined by the specific treatment goals for an individual patient. A patient that is not at their treatment goal is not stable, even if the condition has not changed and there is no short- term threat to life or function.
Treatment Goal Met?: no
Treatment Goal Met?: yes
no

## 2024-10-30 ENCOUNTER — OUTPATIENT (OUTPATIENT)
Dept: OUTPATIENT SERVICES | Facility: HOSPITAL | Age: 79
LOS: 1 days | End: 2024-10-30
Payer: MEDICARE

## 2024-10-30 ENCOUNTER — APPOINTMENT (OUTPATIENT)
Dept: CT IMAGING | Facility: HOSPITAL | Age: 79
End: 2024-10-30
Payer: MEDICARE

## 2024-10-30 DIAGNOSIS — Z90.710 ACQUIRED ABSENCE OF BOTH CERVIX AND UTERUS: Chronic | ICD-10-CM

## 2024-10-30 DIAGNOSIS — Z00.8 ENCOUNTER FOR OTHER GENERAL EXAMINATION: ICD-10-CM

## 2024-10-30 DIAGNOSIS — Z87.19 PERSONAL HISTORY OF OTHER DISEASES OF THE DIGESTIVE SYSTEM: Chronic | ICD-10-CM

## 2024-10-30 DIAGNOSIS — Z98.890 OTHER SPECIFIED POSTPROCEDURAL STATES: Chronic | ICD-10-CM

## 2024-10-30 PROCEDURE — 74176 CT ABD & PELVIS W/O CONTRAST: CPT | Mod: 26,MH

## 2024-11-20 PROCEDURE — 74176 CT ABD & PELVIS W/O CONTRAST: CPT | Mod: MH

## (undated) DEVICE — ELCTR BOVIE TIP NEEDLE INSULATED 2.8" EDGE

## (undated) DEVICE — TROCAR COVIDIEN VERSASTEP PLUS 11MM STANDARD

## (undated) DEVICE — DRAIN JACKSON PRATT 10MM FLAT FULL NO TROCAR

## (undated) DEVICE — GLV 8 PROTEXIS (WHITE)

## (undated) DEVICE — DRAPE INSTRUMENT POUCH 6.75" X 11"

## (undated) DEVICE — TROCAR ETHICON ENDOPATH XCEL BLADELESS 5MM X 100MM STABILITY

## (undated) DEVICE — ENDOCATCH 10MM SPECIMEN POUCH

## (undated) DEVICE — GLV 6.5 PROTEXIS (WHITE)

## (undated) DEVICE — Device

## (undated) DEVICE — SUT SOFSILK 3-0 30" V-20

## (undated) DEVICE — STAPLER SKIN VISI-STAT 35 WIDE

## (undated) DEVICE — SHEARS COVIDIEN ENDO SHEAR 5MM X 31CM W UNIPOLAR CAUTERY

## (undated) DEVICE — VENODYNE/SCD SLEEVE CALF MEDIUM

## (undated) DEVICE — GLV 7.5 PROTEXIS (WHITE)

## (undated) DEVICE — PRESSURE INFUSOR BAG 1000ML

## (undated) DEVICE — DRAIN RESERVOIR FOR JACKSON PRATT 100CC CARDINAL

## (undated) DEVICE — SUT ETHIBOND 0 44" EN3

## (undated) DEVICE — SOL IRR POUR NS 0.9% 500ML

## (undated) DEVICE — SOL IRR POUR H2O 250ML

## (undated) DEVICE — TUBING HYDRO-SURG PLUS IRRIGATOR W SMOKEVAC & PROBE

## (undated) DEVICE — TROCAR COVIDIEN VERSASTEP 5MM STANDARD

## (undated) DEVICE — DRAPE 3/4 SHEET W REINFORCEMENT 56X77"

## (undated) DEVICE — TUBING IRRIGATION DAVOL SYSTEM X STREAM

## (undated) DEVICE — WARMING BLANKET UPPER ADULT

## (undated) DEVICE — POSITIONER FOAM EGG CRATE ULNAR 2PCS (PINK)

## (undated) DEVICE — VENODYNE/SCD SLEEVE CALF LARGE

## (undated) DEVICE — DRAIN PENROSE .5" X 18" LATEX

## (undated) DEVICE — DRAPE 1/2 SHEET 40X57"

## (undated) DEVICE — DRAPE MAYO STAND 30"

## (undated) DEVICE — D HELP - CLEARVIEW CLEARIFY SYSTEM

## (undated) DEVICE — DRSG STERISTRIPS 0.5 X 4"

## (undated) DEVICE — PACK ADVANCED LAPAROSCOPIC NS

## (undated) DEVICE — TROCAR COVIDIEN VERSASTEP PLUS 15MM STANDARD

## (undated) DEVICE — SUT POLYSORB 2 30" GS-26

## (undated) DEVICE — CANISTER SUCTION LID GUARD 3000CC

## (undated) DEVICE — LIGASURE MARYLAND 44CM

## (undated) DEVICE — VALVE YELLOW PORT SEAL PLUS 5MM

## (undated) DEVICE — SUT SURGIPRO 2-0 30" V-20

## (undated) DEVICE — LAP PAD 18 X 18"

## (undated) DEVICE — SUT POLYSORB 2-0 30" V-20 UNDYED

## (undated) DEVICE — SUT POLYSORB 3-0 30" V-20 UNDYED

## (undated) DEVICE — GLV 7.5 ULTRAFREE MAX

## (undated) DEVICE — GOWN TRIMAX LG

## (undated) DEVICE — DRSG CURITY GAUZE SPONGE 4 X 4" 12-PLY

## (undated) DEVICE — TROCAR COVIDIEN VERSASTEP PLUS 12MM STANDARD

## (undated) DEVICE — SUT CHROMIC 2-0 60" REEL

## (undated) DEVICE — MEDICATION LABELS W MARKER

## (undated) DEVICE — TROCAR COVIDIEN VERSAONE BLADED FIXATION 11MM STANDARD

## (undated) DEVICE — SUT POLYSORB 0 30" GS-23

## (undated) DEVICE — DRAPE TOWEL BLUE 17" X 24"

## (undated) DEVICE — TAPE UMBILICAL 1/8 X 30" STRANDS

## (undated) DEVICE — PREP CHLORAPREP HI-LITE ORANGE 26ML

## (undated) DEVICE — SCOPE WARMER SEAL DISP

## (undated) DEVICE — STAPLER COVIDIEN ENDO GIA STANDARD HANDLE

## (undated) DEVICE — INSUFFLATION NDL COVIDIEN STEP 14G FOR STEP/VERSASTEP

## (undated) DEVICE — SUT SURGIPRO 0 30" GS-22

## (undated) DEVICE — SPECIMEN CONTAINER 100ML

## (undated) DEVICE — TUBING INSUFFLATION LAP FILTER 10FT

## (undated) DEVICE — SUT CHROMIC 2-0 30" V-20

## (undated) DEVICE — DISSECTOR ENDO PEANUT 5MM

## (undated) DEVICE — SPECIMEN CONTAINER PET

## (undated) DEVICE — SUT SURGIPRO II 2-0 30" GS-21

## (undated) DEVICE — SUCTION YANKAUER NO CONTROL VENT

## (undated) DEVICE — TROCAR COVIDIEN VERSAONE BLADELESS FIXATION 11MM STANDARD

## (undated) DEVICE — TROCAR COVIDIEN VERSAPORT BLADELESS OPTICAL 5MM STANDARD

## (undated) DEVICE — TUBING W FILTER STERILE FOR INSUFFLATION

## (undated) DEVICE — DRAIN PENROSE .25" X 18" LATEX

## (undated) DEVICE — SOLIDIFIER CANN EXPRESS 3K

## (undated) DEVICE — TAPE SILK 3"

## (undated) DEVICE — GLV 7 PROTEXIS (WHITE)

## (undated) DEVICE — TROCAR ETHICON ENDOPATH XCEL BLADELESS 11MM X 100MM STABILITY

## (undated) DEVICE — SUT PDS II 0 18" ENDOLOOP LIGATURE

## (undated) DEVICE — DRAPE LIGHT HANDLE COVER (GREEN)

## (undated) DEVICE — SUT POLYSORB 4-0 30" C-13 UNDYED

## (undated) DEVICE — POSITIONER STRAP ARMBOARD VELCRO TS-30

## (undated) DEVICE — MARKING PEN W RULER

## (undated) DEVICE — SUT BIOSYN 4-0 18" P-12

## (undated) DEVICE — TUBING INSUFLATOR VIDEO TOWER NON HEATED

## (undated) DEVICE — SUT SURGIPRO 1 30" GS-21

## (undated) DEVICE — TUBING IV EXTENSION 30"

## (undated) DEVICE — ELCTR BOVIE PENCIL SMOKE EVACUATION

## (undated) DEVICE — POSITIONER FOAM HEAD CRADLE (PINK)

## (undated) DEVICE — SOL IRR POUR H2O 1500ML

## (undated) DEVICE — PACK MINOR